# Patient Record
Sex: FEMALE | Race: WHITE | NOT HISPANIC OR LATINO | Employment: STUDENT | URBAN - METROPOLITAN AREA
[De-identification: names, ages, dates, MRNs, and addresses within clinical notes are randomized per-mention and may not be internally consistent; named-entity substitution may affect disease eponyms.]

---
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
## 2020-01-01 ENCOUNTER — NURSE TRIAGE (OUTPATIENT)
Dept: NURSING | Facility: CLINIC | Age: 20
End: 2020-01-01

## 2020-01-02 NOTE — TELEPHONE ENCOUNTER
"Caller has had vomiting and diarrhea since early a.m.; did consume alcohol  on this New Years papa \"but not much\"   Vomiting stopped midday and is tolerating water and  simple carbs; diarrhea every  2 hrs with mild  cramping; no fever   Devloped hives on back of hands  in past  30 minutes; localized and no other  swelling or rash   Triage protocol for vomiting and hives  reviewed and home care discussed   advised to call for new or worsening symptoms.  Caller understand  and will comply   Mariam Brown RN  FNA         Additional Information    Negative: Shock suspected (very weak, limp, not moving, too weak to stand, pale cool skin)    Negative: Sounds like a life-threatening emergency to the triager    Negative: Vomiting occurs without diarrhea    Negative: Diarrhea is the main symptom (vomiting is resolved)    Negative: [1] Vomiting and/or diarrhea is present AND [2] age > 1 year AND [3] ate spoiled food in previous 12 hours    Negative: [1] Diarrhea present AND [2] sounds like infant spitting up (reflux)    Negative: Severe dehydration suspected (very dizzy when tries to stand or has fainted)    Negative: [1] Blood (red or coffee grounds color) in the vomit AND [2] not from a nosebleed  (Exception: Few streaks AND only occurs once AND age > 1 year)    Negative: Difficult to awaken    Negative: Confused (delirious) when awake    Negative: Poisoning suspected (with a medicine, plant or chemical)    Negative: [1] Age < 12 weeks AND [2] fever 100.4 F (38.0 C) or higher rectally    Negative: [1]  (< 1 month old) AND [2] starts to look or act abnormal in any way (e.g., decrease in activity or feeding)    Negative: [1] Bile (green color) in the vomit AND [2] 2 or more times (Exception: Stomach juice which is yellow)    Negative: [1] Age < 12 months AND [2] bile (green color) in the vomit (Exception: Stomach juice which is yellow)    Negative: [1] SEVERE abdominal pain (when not vomiting) AND [2] present > 1 " hour    Negative: Appendicitis suspected (e.g., constant pain > 2 hours, RLQ location, walks bent over holding abdomen, jumping makes pain worse, etc)    Negative: [1] Blood in the diarrhea AND [2] 3 or more times (or large amount)    Negative: [1] Dehydration suspected AND [2] age < 1 year (Signs: no urine > 8 hours AND very dry mouth, no tears, ill appearing, etc.)    Negative: [1] Dehydration suspected AND [2] age > 1 year (Signs: no urine > 12 hours AND very dry mouth, no tears, ill appearing, etc.)    Negative: High-risk child (e.g., diabetes mellitus, recent abdominal surgery)    Negative: [1] Fever AND [2] > 105 F (40.6 C) by any route OR axillary > 104 F (40 C)    Negative: [1] Fever AND [2] weak immune system (sickle cell disease, HIV, splenectomy, chemotherapy, organ transplant, chronic oral steroids, etc)    Negative: Child sounds very sick or weak to the triager    Negative: [1] Age < 12 weeks AND [2] vomited 3 or more times in last 24 hours  (Exception: reflux or spitting up)    Negative: [1] Age < 1 year old AND [2] after receiving frequent sips of ORS per guideline AND [3] continues to vomit 3 or more times AND [4] also has frequent watery diarrhea    Negative: [1] SEVERE vomiting (vomiting everything) > 8 hours (> 12 hours for > 7 yo) AND [2] continues after giving frequent sips of ORS using correct technique per guideline    Negative: [1] Continuous abdominal pain or crying AND [2] persists > 2 hours  (Caution: intermittent abdominal pain that comes on with vomiting and then goes away is common)    Negative: [1] Life-threatening reaction (anaphylaxis) in the past to similar substance (e.g., food, insect bite/sting, chemical, etc.) AND [2] < 2 hours since exposure    Negative: Difficulty breathing or wheezing now    Negative: [1] Swollen tongue AND [2] rapid onset    Negative: [1] Hoarseness or cough now AND [2] rapid onset    Negative: Shock suspected (e.g., cold/pale/clammy skin, too weak to stand,  low BP, rapid pulse)    Negative: Difficult to awaken or acting confused (e.g., disoriented, slurred speech)    Negative: Sounds like a life-threatening emergency to the triager    Negative: [1] Bee, wasp, or yellow jacket sting AND [2] within last 24 hours    Negative: Blood-colored, dark red or purple rash    Negative: [1] Drug rash suspected AND [2] started taking new medicine within last 2 weeks(Exception: antihistamine, eye drops, ear drops, decongestant or other OTC cough/cold medicines)    Negative: Doesn't match the SYMPTOMS of hives    Negative: Swollen tongue    Negative: [1] Widespread hives AND [2] onset < 2 hours of exposure to high-risk allergen (e.g., peanuts, tree nuts, fish or shellfish)    Negative: Patient sounds very sick or weak to the triager    Negative: [1] MODERATE-SEVERE hives persist (i.e., hives interfere with normal activities or work) AND [2] taking antihistamine (e.g., Benadryl, Claritin) > 24 hours    Negative: [1] Hives have become worse AND [2] taking oral steroids (e.g., prednisone) > 24 hours    Negative: Joint swelling    Negative: [1] Abdominal pain AND [2] pain present > 12 hours    Negative: Fever    Negative: [1] Widespread hives, itching or facial swelling AND [2] onset > 2 hours after exposure to high-risk allergen (e.g., sting, nuts, 1st dose of antibiotic)    Negative: [1] Hives from food reaction AND [2] diagnosis never confirmed by a HCP    Negative: Hives persist > 1 week    Negative: [1] Hives has occurred 3 or more times in the last year AND [2] the cause was not found    Localized hives    Protocols used: VOMITING WITH DIARRHEA-P-AH, HIVES-A-AH

## 2020-02-05 ENCOUNTER — TRANSFERRED RECORDS (OUTPATIENT)
Dept: HEALTH INFORMATION MANAGEMENT | Facility: CLINIC | Age: 20
End: 2020-02-05

## 2020-02-05 ENCOUNTER — MEDICAL CORRESPONDENCE (OUTPATIENT)
Dept: HEALTH INFORMATION MANAGEMENT | Facility: CLINIC | Age: 20
End: 2020-02-05

## 2020-02-20 ENCOUNTER — TRANSCRIBE ORDERS (OUTPATIENT)
Dept: OTHER | Age: 20
End: 2020-02-20

## 2020-02-20 DIAGNOSIS — K62.5 RECTAL BLEEDING: Primary | ICD-10-CM

## 2020-02-25 NOTE — TELEPHONE ENCOUNTER
RECORDS RECEIVED FROM: [facility]- Dr. Babita Leija    DATE RECEIVED: 4/22/2020   NOTES STATUS DETAILS   OFFICE NOTE from referring provider Received 2/5/2020 Referral   2/5/2020 Office visit with Dr. Leija    OFFICE NOTE from other specialist N/A    DISCHARGE SUMMARY from hospital N/A    OPERATIVE REPORT N/A    MEDICATION LIST Internal         ENDOSCOPY  N/A    COLONOSCOPY N/A    ERCP N/A    EUS N/A    STOOL TESTING N/A    PERTINENT LABS Internal    PATHOLOGY REPORTS (RELATED) N/A    IMAGING (CT, MRI, EGD) N/A      REFERRAL INFORMATION    Date referral was placed: 4/22/2020   Date all records received: N/A   Date records were scanned into Epic: N/A   Date records were sent to Provider to review: N/A   Date and recommendation received from provider:  LETTER SENT  SCHEDULE APPOINTMENT   Date patient was contacted to schedule: 2/24/2020

## 2020-03-11 ENCOUNTER — HEALTH MAINTENANCE LETTER (OUTPATIENT)
Age: 20
End: 2020-03-11

## 2020-04-22 ENCOUNTER — PRE VISIT (OUTPATIENT)
Dept: SURGERY | Facility: CLINIC | Age: 20
End: 2020-04-22

## 2020-06-19 ENCOUNTER — NURSE TRIAGE (OUTPATIENT)
Dept: NURSING | Facility: CLINIC | Age: 20
End: 2020-06-19

## 2020-06-19 ENCOUNTER — ANCILLARY PROCEDURE (OUTPATIENT)
Dept: ULTRASOUND IMAGING | Facility: CLINIC | Age: 20
End: 2020-06-19
Attending: INTERNAL MEDICINE
Payer: COMMERCIAL

## 2020-06-19 ENCOUNTER — APPOINTMENT (OUTPATIENT)
Dept: GENERAL RADIOLOGY | Facility: CLINIC | Age: 20
End: 2020-06-19
Attending: INTERNAL MEDICINE
Payer: COMMERCIAL

## 2020-06-19 ENCOUNTER — HOSPITAL ENCOUNTER (EMERGENCY)
Facility: CLINIC | Age: 20
Discharge: HOME | End: 2020-06-19
Attending: INTERNAL MEDICINE | Admitting: INTERNAL MEDICINE
Payer: COMMERCIAL

## 2020-06-19 VITALS
TEMPERATURE: 97.8 F | RESPIRATION RATE: 12 BRPM | SYSTOLIC BLOOD PRESSURE: 126 MMHG | HEART RATE: 58 BPM | OXYGEN SATURATION: 100 % | DIASTOLIC BLOOD PRESSURE: 80 MMHG

## 2020-06-19 DIAGNOSIS — R07.89 ATYPICAL CHEST PAIN: ICD-10-CM

## 2020-06-19 LAB
ALBUMIN SERPL-MCNC: 3.6 G/DL (ref 3.4–5)
ALBUMIN UR-MCNC: 10 MG/DL
ALP SERPL-CCNC: 66 U/L (ref 40–150)
ALT SERPL W P-5'-P-CCNC: 28 U/L (ref 0–50)
AMORPH CRY #/AREA URNS HPF: ABNORMAL /HPF
ANION GAP SERPL CALCULATED.3IONS-SCNC: 5 MMOL/L (ref 3–14)
APPEARANCE UR: ABNORMAL
AST SERPL W P-5'-P-CCNC: 46 U/L (ref 0–45)
BASOPHILS # BLD AUTO: 0.1 10E9/L (ref 0–0.2)
BASOPHILS NFR BLD AUTO: 0.6 %
BILIRUB SERPL-MCNC: 0.3 MG/DL (ref 0.2–1.3)
BILIRUB UR QL STRIP: NEGATIVE
BUN SERPL-MCNC: 10 MG/DL (ref 7–30)
CALCIUM SERPL-MCNC: 8.8 MG/DL (ref 8.5–10.1)
CAOX CRY #/AREA URNS HPF: ABNORMAL /HPF
CHLORIDE SERPL-SCNC: 109 MMOL/L (ref 94–109)
CO2 SERPL-SCNC: 25 MMOL/L (ref 20–32)
COLOR UR AUTO: YELLOW
CREAT SERPL-MCNC: 0.76 MG/DL (ref 0.52–1.04)
CRP SERPL-MCNC: 6.6 MG/L (ref 0–8)
D DIMER PPP FEU-MCNC: 0.3 UG/ML FEU (ref 0–0.5)
DIFFERENTIAL METHOD BLD: NORMAL
EOSINOPHIL # BLD AUTO: 0.3 10E9/L (ref 0–0.7)
EOSINOPHIL NFR BLD AUTO: 3.1 %
ERYTHROCYTE [DISTWIDTH] IN BLOOD BY AUTOMATED COUNT: 12.3 % (ref 10–15)
ERYTHROCYTE [SEDIMENTATION RATE] IN BLOOD BY WESTERGREN METHOD: 6 MM/H (ref 0–20)
GFR SERPL CREATININE-BSD FRML MDRD: >90 ML/MIN/{1.73_M2}
GLUCOSE SERPL-MCNC: 100 MG/DL (ref 70–99)
GLUCOSE UR STRIP-MCNC: NEGATIVE MG/DL
HCG UR QL: NEGATIVE
HCT VFR BLD AUTO: 38.7 % (ref 35–47)
HGB BLD-MCNC: 13 G/DL (ref 11.7–15.7)
HGB UR QL STRIP: ABNORMAL
IMM GRANULOCYTES # BLD: 0 10E9/L (ref 0–0.4)
IMM GRANULOCYTES NFR BLD: 0.1 %
INR PPP: 1.06 (ref 0.86–1.14)
INTERNAL QC OK POCT: YES
KETONES UR STRIP-MCNC: NEGATIVE MG/DL
LEUKOCYTE ESTERASE UR QL STRIP: ABNORMAL
LYMPHOCYTES # BLD AUTO: 3.7 10E9/L (ref 0.8–5.3)
LYMPHOCYTES NFR BLD AUTO: 45.1 %
MCH RBC QN AUTO: 29.3 PG (ref 26.5–33)
MCHC RBC AUTO-ENTMCNC: 33.6 G/DL (ref 31.5–36.5)
MCV RBC AUTO: 87 FL (ref 78–100)
MONOCYTES # BLD AUTO: 0.8 10E9/L (ref 0–1.3)
MONOCYTES NFR BLD AUTO: 9.9 %
MUCOUS THREADS #/AREA URNS LPF: PRESENT /LPF
NEUTROPHILS # BLD AUTO: 3.4 10E9/L (ref 1.6–8.3)
NEUTROPHILS NFR BLD AUTO: 41.2 %
NITRATE UR QL: NEGATIVE
NRBC # BLD AUTO: 0 10*3/UL
NRBC BLD AUTO-RTO: 0 /100
PH UR STRIP: 6 PH (ref 5–7)
PLATELET # BLD AUTO: 283 10E9/L (ref 150–450)
POTASSIUM SERPL-SCNC: 3.7 MMOL/L (ref 3.4–5.3)
PROT SERPL-MCNC: 7 G/DL (ref 6.8–8.8)
RBC # BLD AUTO: 4.43 10E12/L (ref 3.8–5.2)
RBC #/AREA URNS AUTO: 0 /HPF (ref 0–2)
SODIUM SERPL-SCNC: 139 MMOL/L (ref 133–144)
SOURCE: ABNORMAL
SP GR UR STRIP: 1.03 (ref 1–1.03)
SQUAMOUS #/AREA URNS AUTO: 2 /HPF (ref 0–1)
TROPONIN I SERPL-MCNC: <0.015 UG/L (ref 0–0.04)
UROBILINOGEN UR STRIP-MCNC: 2 MG/DL (ref 0–2)
WBC # BLD AUTO: 8.1 10E9/L (ref 4–11)
WBC #/AREA URNS AUTO: 13 /HPF (ref 0–5)

## 2020-06-19 PROCEDURE — 99285 EMERGENCY DEPT VISIT HI MDM: CPT | Mod: 25 | Performed by: INTERNAL MEDICINE

## 2020-06-19 PROCEDURE — 93005 ELECTROCARDIOGRAM TRACING: CPT | Performed by: INTERNAL MEDICINE

## 2020-06-19 PROCEDURE — 25800030 ZZH RX IP 258 OP 636: Performed by: INTERNAL MEDICINE

## 2020-06-19 PROCEDURE — 85025 COMPLETE CBC W/AUTO DIFF WBC: CPT | Performed by: INTERNAL MEDICINE

## 2020-06-19 PROCEDURE — 93308 TTE F-UP OR LMTD: CPT | Mod: 26 | Performed by: INTERNAL MEDICINE

## 2020-06-19 PROCEDURE — 85652 RBC SED RATE AUTOMATED: CPT | Performed by: INTERNAL MEDICINE

## 2020-06-19 PROCEDURE — 96361 HYDRATE IV INFUSION ADD-ON: CPT | Performed by: INTERNAL MEDICINE

## 2020-06-19 PROCEDURE — 81001 URINALYSIS AUTO W/SCOPE: CPT | Performed by: INTERNAL MEDICINE

## 2020-06-19 PROCEDURE — 81025 URINE PREGNANCY TEST: CPT | Performed by: INTERNAL MEDICINE

## 2020-06-19 PROCEDURE — 93308 TTE F-UP OR LMTD: CPT | Performed by: INTERNAL MEDICINE

## 2020-06-19 PROCEDURE — 86140 C-REACTIVE PROTEIN: CPT | Performed by: INTERNAL MEDICINE

## 2020-06-19 PROCEDURE — 80053 COMPREHEN METABOLIC PANEL: CPT | Performed by: INTERNAL MEDICINE

## 2020-06-19 PROCEDURE — 85610 PROTHROMBIN TIME: CPT | Performed by: INTERNAL MEDICINE

## 2020-06-19 PROCEDURE — 85379 FIBRIN DEGRADATION QUANT: CPT | Performed by: INTERNAL MEDICINE

## 2020-06-19 PROCEDURE — 96360 HYDRATION IV INFUSION INIT: CPT | Performed by: INTERNAL MEDICINE

## 2020-06-19 PROCEDURE — 84484 ASSAY OF TROPONIN QUANT: CPT | Performed by: INTERNAL MEDICINE

## 2020-06-19 PROCEDURE — 93010 ELECTROCARDIOGRAM REPORT: CPT | Mod: Z6 | Performed by: INTERNAL MEDICINE

## 2020-06-19 PROCEDURE — 71046 X-RAY EXAM CHEST 2 VIEWS: CPT

## 2020-06-19 PROCEDURE — 87086 URINE CULTURE/COLONY COUNT: CPT | Performed by: INTERNAL MEDICINE

## 2020-06-19 RX ORDER — FAMOTIDINE 20 MG/1
20 TABLET, FILM COATED ORAL 2 TIMES DAILY
Qty: 30 TABLET | Refills: 0 | Status: SHIPPED | OUTPATIENT
Start: 2020-06-19 | End: 2020-07-04

## 2020-06-19 RX ORDER — CITALOPRAM HYDROBROMIDE 10 MG/1
20 TABLET ORAL DAILY
COMMUNITY
End: 2021-03-31

## 2020-06-19 RX ADMIN — SODIUM CHLORIDE 1000 ML: 9 INJECTION, SOLUTION INTRAVENOUS at 21:56

## 2020-06-19 ASSESSMENT — ENCOUNTER SYMPTOMS
CHEST TIGHTNESS: 1
NERVOUS/ANXIOUS: 1

## 2020-06-19 NOTE — ED AVS SNAPSHOT
[facility], [place], Emergency Department  [address]  [facility] [address]  Phone:  [phone]  Fax:  [phone]                                    Zainab Wells   MRN: [id number]    Department:  [facility], Emergency Department   Date of Visit:  6/19/2020           After Visit Summary Signature Page    I have received my discharge instructions, and my questions have been answered. I have discussed any challenges I see with this plan with the nurse or doctor.    ..........................................................................................................................................  Patient/Patient Representative Signature      ..........................................................................................................................................  Patient Representative Print Name and Relationship to Patient    ..................................................               ................................................  Date                                   Time    ..........................................................................................................................................  Reviewed by Signature/Title    ...................................................              ..............................................  Date                                               Time          22EPIC Rev 08/18

## 2020-06-20 LAB — INTERPRETATION ECG - MUSE: NORMAL

## 2020-06-20 ASSESSMENT — ENCOUNTER SYMPTOMS
FEVER: 0
HEADACHES: 0
ARTHRALGIAS: 0
SHORTNESS OF BREATH: 0
COLOR CHANGE: 0
EYE REDNESS: 0
DIFFICULTY URINATING: 0
CONFUSION: 0
ABDOMINAL PAIN: 0
NECK STIFFNESS: 0

## 2020-06-20 NOTE — TELEPHONE ENCOUNTER
"Patient has had chest tightness over the last month and treated with inhalers which \"haven't really helped.\"  Was advised to go to hospital if it got worse.  Tonight is having pain/tightness with some shortness of breath.  Will go to ER per guidelines.    Ame Greenwood RN  [facility]      Reason for Disposition    Difficulty breathing    Additional Information    Negative: Severe difficulty breathing (e.g., struggling for each breath, speaks in single words)    Negative: Difficult to awaken or acting confused (e.g., disoriented, slurred speech)    Negative: Shock suspected (e.g., cold/pale/clammy skin, too weak to stand, low BP, rapid pulse)    Negative: [1] Chest pain lasts > 5 minutes AND [2] history of heart disease  (i.e., heart attack, bypass surgery, angina, angioplasty, CHF; not just a heart murmur)    Negative: [1] Chest pain lasts > 5 minutes AND [2] described as crushing, pressure-like, or heavy    Negative: [1] Chest pain lasts > 5 minutes AND [2] age > 50    Negative: [1] Chest pain lasts > 5 minutes AND [2] age > 30 AND [3] at least one cardiac risk factor (i.e., hypertension, diabetes, obesity, smoker or strong family history of heart disease)    Negative: [1] Chest pain lasts > 5 minutes AND [2] not relieved with nitroglycerin    Negative: Passed out (i.e., lost consciousness, collapsed and was not responding)    Negative: Heart beating < 50 beats per minute OR > 140 beats per minute    Negative: Visible sweat on face or sweat dripping down face    Negative: Sounds like a life-threatening emergency to the triager    Negative: SEVERE chest pain    Negative: [1] Intermittent  chest pain or \"angina\" AND [2] increasing in severity or frequency  (Exception: pains lasting a few seconds)    Negative: Pain also present in shoulder(s) or arm(s) or jaw  (Exception: pain is clearly made worse by movement)    Protocols used: CHEST PAIN-A-AH      "

## 2020-06-20 NOTE — ED NOTES
Pt was given discharge instructions and understands to  her prescribed medication. Vital signs stable. No further questions or concerns.

## 2020-06-20 NOTE — DISCHARGE INSTRUCTIONS
Noncardiac Chest Pain    Based on your visit today, the healthcare provider doesn t know what is causing your chest pain. In most cases, people who come to the emergency department with chest pain don t have a problem with their heart. Instead, the pain is caused by other conditions. It's important for the healthcare team to be sure you are not having a life threatening cause for chest pain such as a heart attack, blood clot in the lungs, collapsed lung, ruptured esophagus, or tearing of the aorta. Once these major causes have been ruled out, you may have further evaluation for non-heart causes of chest pain. These may be problems with the lungs, muscles, bones, digestive tract, nerves, or mental health.  Lung problems    Inflammation around the lungs (pleurisy)    Collapsed lung (pneumothorax)    Fluid around the lungs (pleural effusion)    Lung cancer (a rare cause of chest pain)  Muscle or bone problems    Inflamed cartilage between the ribs (costochondritis)    Fibromyalgia    Rheumatoid arthritis    Chest wall strain  Digestive system problems    Reflux    Stomach ulcer    Spasms of the esophagus    Gall stones    Gallbladder inflammation  Mental health conditions    Panic or anxiety attacks    Emotional distress  Your condition doesn t seem serious and your pain doesn t appear to be coming from your heart. But sometimes the signs of a serious problem take more time to appear. Watch for the warning signs listed below.  Home care  Follow these guidelines when caring for yourself at home:    Rest today and avoid strenuous activity.    Take any prescribed medicine as directed.  Follow-up care  Follow up with your healthcare provider, or as advised, if you don t start to feel better within 24 hours.  When to seek medical advice  Call your healthcare provider right away if any of these occur:    A change in the type of pain. Call if it feels different, becomes more serious, lasts longer, or begins to spread into  your shoulder, arm, neck, jaw, or back.    Shortness of breath    You feel more pain when you breathe    Cough with dark-colored mucus or blood    Weakness, dizziness, or fainting    Fever of 100.4 F (38 C) or higher, or as directed by your healthcare provider    Swelling, pain, or redness in one leg  Date Last Reviewed: 12/1/2016 2000-2019 [employer] [address] [place] [address]. All rights reserved. This information is not intended as a substitute for professional medical care. Always follow your healthcare professional's instructions.      Please make an appointment to follow up with Your Primary Care Provider in 5-10 days even if entirely better.

## 2020-06-20 NOTE — ED PROVIDER NOTES
[place] EMERGENCY DEPARTMENT ([facility])  June 19, 2020  ED 10  9:36 PM   History     Chief Complaint   Patient presents with     Anxiety     Chest Pain     The history is provided by the patient and medical records.     Zainab Wells is a 20 year old female with prior episodes of vasovagal syncope who presents with intermittent episodes of chest tightness. She states symptoms were mild for the past month. She was seen by a provider who prescribed her an inhaler. She was told that if symptoms worsened she should go to the ER. She had COVID-19 testing 1 week ago at [facility] in [place], which was negative. She denies cardiac history. No fevers. No lower extremity edema.     Epic records reviewed, she did have a thorough work-up for episodes of vasovagal syncope in 2018 with a Zio patch Holter monitor, tilt table testing, as well as an echocardiogram.  She did have a syncopal spell after receiving 0.4 mg sublingual nitroglycerin during the tilt table testing.  It was felt that she had a normal physiologic response.      PAST MEDICAL HISTORY:   Past Medical History:   Diagnosis Date     Anemia      GERD (gastroesophageal reflux disease)        PAST SURGICAL HISTORY:   Past Surgical History:   Procedure Laterality Date     ORTHOPEDIC SURGERY Right     shoulder       Past medical history, past surgical history, medications, and allergies were reviewed with the patient. Additional pertinent items: None    FAMILY HISTORY: History reviewed. No pertinent family history.    SOCIAL HISTORY:   Social History     Tobacco Use     Smoking status: Never Smoker     Smokeless tobacco: Never Used   Substance Use Topics     Alcohol use: No     Social history was reviewed with the patient. Additional pertinent items: None      Discharge Medication List as of 6/19/2020 11:51 PM      START taking these medications    Details   famotidine (PEPCID) 20 MG tablet Take 1 tablet (20 mg) by mouth 2 times daily for 15  days, Disp-30 tablet,R-0, E-Prescribe         CONTINUE these medications which have NOT CHANGED    Details   citalopram (CELEXA) 10 MG tablet Take 10 mg by mouth daily, Historical      crisaborole (EUCRISA) 2 % ointment Apply topically 2 times dailyHistorical      JUNEL 1/20 1-20 MG-MCG per tablet TAKE 1 TABLET BY MOUTH DAILY FOR 21 DAYS, off FOR 7 DAYS, THEN REPEAT., R-10, MUSTAPHA, Historical      minocycline (MINOCIN/DYNACIN) 50 MG capsule TAKE 1 CAPSULE BY MOUTH DAILY FOR ACNE., R-11, Historical      omeprazole (PRILOSEC OTC) 20 MG EC tablet Take 20 mg by mouth daily, Historical      Pyridoxine HCl (VITAMIN B6 PO) Take 250 mg by mouth daily, Historical      tacrolimus (PROTOPIC) 0.1 % ointment APPLY TOPICALLY TWICE DAILY TO ECZEMA ON FACE.R-1Historical      VITAMIN D, CHOLECALCIFEROL, PO Take 5,000 Units by mouth daily, Historical                Allergies   Allergen Reactions     Bactrim [Sulfamethoxazole W/Trimethoprim]         Review of Systems   Constitutional: Negative for fever.   HENT: Negative for congestion.    Eyes: Negative for redness.   Respiratory: Positive for chest tightness. Negative for shortness of breath.    Cardiovascular: Negative for chest pain.   Gastrointestinal: Negative for abdominal pain.   Genitourinary: Negative for difficulty urinating.   Musculoskeletal: Negative for arthralgias and neck stiffness.   Skin: Negative for color change.   Neurological: Negative for headaches.   Psychiatric/Behavioral: Negative for confusion. The patient is nervous/anxious.      A complete review of systems was performed with pertinent positives and negatives noted in the HPI, and all other systems negative.    Physical Exam   BP: 135/73  Pulse: 98  Heart Rate: 51  Temp: 97.7  F (36.5  C)  Resp: 20  SpO2: 98 %      Physical Exam  Constitutional:       General: She is not in acute distress.     Appearance: She is not diaphoretic.   HENT:      Head: Atraumatic.      Mouth/Throat:      Pharynx: No oropharyngeal  exudate.   Eyes:      General: No scleral icterus.     Pupils: Pupils are equal, round, and reactive to light.   Neck:      Musculoskeletal: Neck supple.   Cardiovascular:      Rate and Rhythm: Normal rate and regular rhythm.      Heart sounds: Normal heart sounds. No murmur. No friction rub. No gallop.    Pulmonary:      Effort: Pulmonary effort is normal. No respiratory distress.      Breath sounds: Normal breath sounds. No stridor. No wheezing, rhonchi or rales.   Chest:      Chest wall: No tenderness.   Abdominal:      General: Abdomen is flat. Bowel sounds are normal. There is no distension.      Palpations: Abdomen is soft. There is no mass.      Tenderness: There is no abdominal tenderness. There is no right CVA tenderness, left CVA tenderness, guarding or rebound.      Hernia: No hernia is present.   Musculoskeletal:         General: No tenderness.   Skin:     General: Skin is warm.      Findings: No rash.   Neurological:      General: No focal deficit present.         ED Course        Procedures  Results for orders placed during the hospital encounter of 06/19/20   POC US ECHO LIMITED    Impression Limited Bedside Cardiac Ultrasound, performed and interpreted by me.   Indication: Chest Pain.  Parasternal long axis, parasternal short axis, apical 4 chamber and subcostal views were acquired.   Image quality was satisfactory.    Findings:    Global left ventricular function appears intact.  Chambers do not appear dilated.  There is no evidence of free fluid within the pericardium.    IMPRESSION: Grossly normal left ventricular function and chamber size.  No pericardial effusion..              EKG Interpretation:      Interpreted by Stephanie Minaya MD, MD  Time reviewed: on arrival  Symptoms at time of EKG: cp   Rhythm: sinus bradycardia  Rate: Bradycardia  Axis: Right Axis Deviation  Ectopy: none  Conduction: normal  ST Segments/ T Waves: No ST-T wave changes  Q Waves: none  Comparison to prior: Unchanged from  2018    Clinical Impression: no acute changes                              Results for orders placed or performed during the hospital encounter of 06/19/20 (from the past 24 hour(s))   EKG 12 lead   Result Value Ref Range    Interpretation ECG Click View Image link to view waveform and result    CBC with platelets differential   Result Value Ref Range    WBC 8.1 4.0 - 11.0 10e9/L    RBC Count 4.43 3.8 - 5.2 10e12/L    Hemoglobin 13.0 11.7 - 15.7 g/dL    Hematocrit 38.7 35.0 - 47.0 %    MCV 87 78 - 100 fl    MCH 29.3 26.5 - 33.0 pg    MCHC 33.6 31.5 - 36.5 g/dL    RDW 12.3 10.0 - 15.0 %    Platelet Count 283 150 - 450 10e9/L    Diff Method Automated Method     % Neutrophils 41.2 %    % Lymphocytes 45.1 %    % Monocytes 9.9 %    % Eosinophils 3.1 %    % Basophils 0.6 %    % Immature Granulocytes 0.1 %    Nucleated RBCs 0 0 /100    Absolute Neutrophil 3.4 1.6 - 8.3 10e9/L    Absolute Lymphocytes 3.7 0.8 - 5.3 10e9/L    Absolute Monocytes 0.8 0.0 - 1.3 10e9/L    Absolute Eosinophils 0.3 0.0 - 0.7 10e9/L    Absolute Basophils 0.1 0.0 - 0.2 10e9/L    Abs Immature Granulocytes 0.0 0 - 0.4 10e9/L    Absolute Nucleated RBC 0.0    INR   Result Value Ref Range    INR 1.06 0.86 - 1.14   Comprehensive metabolic panel   Result Value Ref Range    Sodium 139 133 - 144 mmol/L    Potassium 3.7 3.4 - 5.3 mmol/L    Chloride 109 94 - 109 mmol/L    Carbon Dioxide 25 20 - 32 mmol/L    Anion Gap 5 3 - 14 mmol/L    Glucose 100 (H) 70 - 99 mg/dL    Urea Nitrogen 10 7 - 30 mg/dL    Creatinine 0.76 0.52 - 1.04 mg/dL    GFR Estimate >90 >60 mL/min/[1.73_m2]    GFR Estimate If Black >90 >60 mL/min/[1.73_m2]    Calcium 8.8 8.5 - 10.1 mg/dL    Bilirubin Total 0.3 0.2 - 1.3 mg/dL    Albumin 3.6 3.4 - 5.0 g/dL    Protein Total 7.0 6.8 - 8.8 g/dL    Alkaline Phosphatase 66 40 - 150 U/L    ALT 28 0 - 50 U/L    AST 46 (H) 0 - 45 U/L   Troponin I   Result Value Ref Range    Troponin I ES <0.015 0.000 - 0.045 ug/L   Erythrocyte sedimentation rate auto    Result Value Ref Range    Sed Rate 6 0 - 20 mm/h   CRP inflammation   Result Value Ref Range    CRP Inflammation 6.6 0.0 - 8.0 mg/L   D dimer quantitative   Result Value Ref Range    D Dimer 0.3 0.0 - 0.50 ug/ml FEU   UA reflex to Microscopic and Culture    Specimen: Urine Midstream; Midstream Urine   Result Value Ref Range    Color Urine Yellow     Appearance Urine Slightly Cloudy     Glucose Urine Negative NEG^Negative mg/dL    Bilirubin Urine Negative NEG^Negative    Ketones Urine Negative NEG^Negative mg/dL    Specific Gravity Urine 1.029 1.003 - 1.035    Blood Urine Trace (A) NEG^Negative    pH Urine 6.0 5.0 - 7.0 pH    Protein Albumin Urine 10 (A) NEG^Negative mg/dL    Urobilinogen mg/dL 2.0 0.0 - 2.0 mg/dL    Nitrite Urine Negative NEG^Negative    Leukocyte Esterase Urine Small (A) NEG^Negative    Source Midstream Urine     RBC Urine 0 0 - 2 /HPF    WBC Urine 13 (H) 0 - 5 /HPF    Squamous Epithelial /HPF Urine 2 (H) 0 - 1 /HPF    Mucous Urine Present (A) NEG^Negative /LPF    Calcium Oxalate Many (A) NEG^Negative /HPF    Amorphous Crystals Few (A) NEG^Negative /HPF   hCG qual urine POCT   Result Value Ref Range    HCG Qual Urine Negative neg    Internal QC OK Yes    POC US ECHO LIMITED    Impression    Limited Bedside Cardiac Ultrasound, performed and interpreted by me.   Indication: Chest Pain.  Parasternal long axis, parasternal short axis, apical 4 chamber and subcostal views were acquired.   Image quality was satisfactory.    Findings:    Global left ventricular function appears intact.  Chambers do not appear dilated.  There is no evidence of free fluid within the pericardium.    IMPRESSION: Grossly normal left ventricular function and chamber size.  No pericardial effusion..    XR Chest 2 Views    Narrative    EXAM: XR CHEST 2 VW  LOCATION: [facility]  DATE/TIME: 6/19/2020 11:03 PM    INDICATION: Chest pain.  COMPARISON: None.      Impression    IMPRESSION: Negative chest.     Medications    0.9% sodium chloride BOLUS (0 mLs Intravenous Stopped 6/19/20 5850)             Assessments & Plan (with Medical Decision Making)    Atypical CP in the pt with h/o vasovagal syncope, etiology not clear but EKG trop, POCUS neg, CXR neg, D Dimer neg and other labs ok, will start trial of pepcid and follow up with her PMD within one week.         I have reviewed the nursing notes.    I have reviewed the findings, diagnosis, plan and need for follow up with the patient.    Discharge Medication List as of 6/19/2020 11:51 PM      START taking these medications    Details   famotidine (PEPCID) 20 MG tablet Take 1 tablet (20 mg) by mouth 2 times daily for 15 days, Disp-30 tablet,R-0, E-Prescribe             Final diagnoses:   Atypical chest pain   Frida ZHOU, am serving as a trained medical scribe to document services personally performed by Stephanie Minaya MD based on the provider's statements to me on June 19, 2020.  This document has been checked and approved by the attending provider.    IStephanie MD, was physically present and have reviewed and verified the accuracy of this note documented by Frida Walters, medical scribe.       6/19/2020   [facility], [place], EMERGENCY DEPARTMENT     Stephanie Minaya MD  06/20/20 0058

## 2020-06-21 LAB
BACTERIA SPEC CULT: NORMAL
Lab: NORMAL
SPECIMEN SOURCE: NORMAL

## 2020-11-05 ENCOUNTER — PRE VISIT (OUTPATIENT)
Dept: SURGERY | Facility: CLINIC | Age: 20
End: 2020-11-05

## 2020-11-05 ENCOUNTER — OFFICE VISIT (OUTPATIENT)
Dept: SURGERY | Facility: CLINIC | Age: 20
End: 2020-11-05
Attending: FAMILY MEDICINE
Payer: COMMERCIAL

## 2020-11-05 VITALS
SYSTOLIC BLOOD PRESSURE: 129 MMHG | DIASTOLIC BLOOD PRESSURE: 73 MMHG | HEIGHT: 68 IN | WEIGHT: 186.3 LBS | OXYGEN SATURATION: 98 % | HEART RATE: 42 BPM | BODY MASS INDEX: 28.23 KG/M2

## 2020-11-05 DIAGNOSIS — R19.7 DIARRHEA, UNSPECIFIED TYPE: ICD-10-CM

## 2020-11-05 DIAGNOSIS — K62.5 RECTAL BLEEDING: Primary | ICD-10-CM

## 2020-11-05 DIAGNOSIS — K59.09 OTHER CONSTIPATION: ICD-10-CM

## 2020-11-05 PROCEDURE — 99202 OFFICE O/P NEW SF 15 MIN: CPT | Performed by: NURSE PRACTITIONER

## 2020-11-05 ASSESSMENT — MIFFLIN-ST. JEOR: SCORE: 1663.55

## 2020-11-05 ASSESSMENT — PAIN SCALES - GENERAL: PAINLEVEL: MODERATE PAIN (5)

## 2020-11-05 NOTE — PROGRESS NOTES
"Colon and Rectal Surgery Consult Clinic Note    Date: 2020     Referring provider:  [place] [address]     RE: Zainab Wells  : 2000  TAMEKA: 2020    Zainab Wells is a very pleasant 20 year old female without a significant past medical history who presents today for rectal bleeding.    Zainab has had intermittent rectal bleeding for many years.  This occurs several times a week and is typically just on the toilet paper with wiping.  She has intermittent constipation and diarrhea.  She will have times where she has constipation for several weeks and then will have diarrhea for several weeks and this can be sudden urges to have bowel movements.  She has associated abdominal pain with bowel movements and some sharp anal pain this harder stools.  She is not on any bowel medications but has tried increasing her dietary fiber without improvement in symptoms.  Her sister was recently diagnosed with ulcerative colitis and she has a family history of a maternal grandfather with colon cancer.  She has never had a colonoscopy.  She denies any nausea, vomiting, or unexplained weight loss.    Physical Examination:  /73 (BP Location: Left arm, Patient Position: Sitting, Cuff Size: Adult Regular)   Pulse (!) 42   Ht 5' 8\"   Wt 186 lb 4.8 oz   SpO2 98%   BMI 28.33 kg/m    General: Alert, oriented, in no acute distress, sitting comfortably  HEENT: Mucous membranes moist    Perianal external examination: Exam was chaperoned by BELKIS Rivas   Perianal skin: Intact with no excoriation or lichenification.  Lesions: No evidence of an external lesion, nodularity, or induration in the perianal region.  Eversion of buttocks: There was not evidence of an anal fissure. Details: N/A.  Skin tags or external hemorrhoids: None.  Digital rectal examination: Was performed.   Sphincter tone: Good.  Palpable lesions: No.  Other: " None.  Bimanual examination: was not performed    Anoscopy: Was performed.   Hemorrhoids: No significant internal hemorrhoids.  Lesions: No    Assessment/Plan: 20 year old female with intermittent constipation, diarrhea, and rectal bleeding. Her bleeding and anal pain sound consistent with an anal fissure but no fissure seen on exam today and she had bleeding as recently as yesterday. Given her abdominal pain, diarrhea, constipation, and family history of UC, would like her to get a full colonoscopy. Recommended starting a daily fiber supplement to try to regulate bowel movements to see if this improves symptoms. Return to clinic after colonoscopy if bleeding or pain return. Patient's questions were answered to her stated satisfaction and she is in agreement with this plan.      Medical history:  Past Medical History:   Diagnosis Date     Anemia      GERD (gastroesophageal reflux disease)        Surgical history:  Past Surgical History:   Procedure Laterality Date     ORTHOPEDIC SURGERY Right     shoulder       Problem list:    Patient Active Problem List    Diagnosis Date Noted     Syncope 09/12/2018     Priority: Medium       Medications:  Current Outpatient Medications   Medication Sig Dispense Refill     citalopram (CELEXA) 10 MG tablet Take 20 mg by mouth daily        crisaborole (EUCRISA) 2 % ointment Apply topically 2 times daily       levonorgestrel (MIRENA) 20 MCG/24HR IUD 1 each by Intrauterine route once       VITAMIN D, CHOLECALCIFEROL, PO Take 5,000 Units by mouth daily       JUNEL 1/20 1-20 MG-MCG per tablet TAKE 1 TABLET BY MOUTH DAILY FOR 21 DAYS, off FOR 7 DAYS, THEN REPEAT.  10     minocycline (MINOCIN/DYNACIN) 50 MG capsule TAKE 1 CAPSULE BY MOUTH DAILY FOR ACNE.  11     omeprazole (PRILOSEC OTC) 20 MG EC tablet Take 20 mg by mouth daily       Pyridoxine HCl (VITAMIN B6 PO) Take 250 mg by mouth daily       tacrolimus (PROTOPIC) 0.1 % ointment APPLY TOPICALLY TWICE DAILY TO ECZEMA ON FACE.  1  "      Allergies:  Allergies   Allergen Reactions     Bactrim [Sulfamethoxazole W/Trimethoprim]        Family history:  No family history on file.    Social history:  Social History     Tobacco Use     Smoking status: Never Smoker     Smokeless tobacco: Never Used   Substance Use Topics     Alcohol use: No    Marital status: single.  Occupation: student in Croatian and environmental studies    Nursing Notes:   Mar Mccord EMT  11/5/2020 10:24 AM  Signed  Chief Complaint   Patient presents with     Consult     Rectal bleeding       Vitals:    11/05/20 1021   BP: 129/73   BP Location: Left arm   Patient Position: Sitting   Cuff Size: Adult Regular   Pulse: (!) 42   SpO2: 98%   Weight: 186 lb 4.8 oz   Height: 5' 8\"       Body mass index is 28.33 kg/m .      BELKIS Rivas                         Total face to face time was 20 minutes, >50% counseling.    DARELL Zavala, NP-C  Colon and Rectal Surgery   [facility]    This note was created using speech recognition software and may contain unintended word substitutions.    "

## 2020-11-05 NOTE — NURSING NOTE
"Chief Complaint   Patient presents with     Consult     Rectal bleeding       Vitals:    11/05/20 1021   BP: 129/73   BP Location: Left arm   Patient Position: Sitting   Cuff Size: Adult Regular   Pulse: (!) 42   SpO2: 98%   Weight: 186 lb 4.8 oz   Height: 5' 8\"       Body mass index is 28.33 kg/m .      Mar Jones, EMT                      "

## 2020-11-05 NOTE — PATIENT INSTRUCTIONS
Follow up:      Start a daily fiber supplement such as Citrucel or Metamucil. Start with once a day and slowly increase up to three times a day, if needed, over the next 4-6 weeks.     Colonoscopy. They will call you to schedule this, if you do not hear from them today or tomorrow please call [facility] ([facility]) at [phone].    Follow-up depending on results of colonoscopy.    Please call with any questions or concerns regarding your clinic visit today.    It is a pleasure being involved in your health care.    Contacts post-consultation depending on your need:    Radiology Appointments [phone]    Schedule Clinic Appointments [phone] # 1   M-F 7:30 - 5 pm    BELLO Baker [phone]    Clinic Fax Number [phone]    Surgery Scheduling [phone]    My Chart is available 24 hours a day and is a secure way to access your records and communicate with your care team.  I strongly recommend signing up if you haven't already done so, if you are comfortable with computers.  If you would like to inquire about this or are having problems with My Chart access, you may call [phone] or go online at [email or web address].  Please allow at least 24 hours for a response and extra time on weekends and Holidays.

## 2020-11-06 ENCOUNTER — HOSPITAL ENCOUNTER (OUTPATIENT)
Facility: AMBULATORY SURGERY CENTER | Age: 20
End: 2020-11-06
Attending: INTERNAL MEDICINE
Payer: COMMERCIAL

## 2020-11-15 DIAGNOSIS — Z11.59 ENCOUNTER FOR SCREENING FOR OTHER VIRAL DISEASES: Primary | ICD-10-CM

## 2020-11-18 ENCOUNTER — TELEPHONE (OUTPATIENT)
Dept: SURGERY | Facility: CLINIC | Age: 20
End: 2020-11-18

## 2020-11-18 NOTE — TELEPHONE ENCOUNTER
Called patient to discuss her C survey and comments. Requested a callback at her earliest convenience.     BELLO Kern BSN  Surgery Clinic and    [facility] Supervisor   [phone]

## 2020-11-19 NOTE — TELEPHONE ENCOUNTER
Called patient to discuss her C survey and comments. Requested a callback at her earliest convenience.     BELLO Kern BSN  Surgery Clinic and    [facility] Clinic Supervisor   [phone]

## 2020-11-23 NOTE — TELEPHONE ENCOUNTER
Called patient regarding her C survey comments. Advised patient this would be the last attempt to connect with her and if she would like to discuss she should callback at her earliest convenience.

## 2020-12-09 ENCOUNTER — TELEPHONE (OUTPATIENT)
Dept: GASTROENTEROLOGY | Facility: CLINIC | Age: 20
End: 2020-12-09

## 2020-12-09 NOTE — TELEPHONE ENCOUNTER
Patient called to cancel Colonoscopy procedure with Dr. Torres on 12/15/20. Will call back to reschedule when ready.    Procedure: Lower Endoscopy    Lower Endoscopy Type: Colonoscopy    Purpose of Colonoscopy Procedure: Diagnostic    Colonoscopy reason for referral: Rectal bleeding, diarrhea, constipation, family history of UC    Colonoscopy Sedation: Conscious/Moderate    Preferred Location: [facility]    Scheduling Instructions: If you have not heard from the scheduling office within 2 business days, please call [phone].           Associated Diagnoses    Rectal bleeding [K62.5]  - Primary       Diarrhea, unspecified type [R19.7]

## 2021-01-03 ENCOUNTER — HEALTH MAINTENANCE LETTER (OUTPATIENT)
Age: 21
End: 2021-01-03

## 2021-03-18 ENCOUNTER — TRANSFERRED RECORDS (OUTPATIENT)
Dept: HEALTH INFORMATION MANAGEMENT | Facility: CLINIC | Age: 21
End: 2021-03-18

## 2021-03-31 ENCOUNTER — HOSPITAL ENCOUNTER (OUTPATIENT)
Facility: CLINIC | Age: 21
Setting detail: OBSERVATION
Discharge: HOME | End: 2021-04-01
Attending: EMERGENCY MEDICINE | Admitting: INTERNAL MEDICINE
Payer: COMMERCIAL

## 2021-03-31 ENCOUNTER — TRANSFERRED RECORDS (OUTPATIENT)
Dept: HEALTH INFORMATION MANAGEMENT | Facility: CLINIC | Age: 21
End: 2021-03-31

## 2021-03-31 DIAGNOSIS — R50.9 FEVER AND CHILLS: ICD-10-CM

## 2021-03-31 DIAGNOSIS — R55 VASOVAGAL SYNCOPE: ICD-10-CM

## 2021-03-31 DIAGNOSIS — K92.1 HEMATOCHEZIA: ICD-10-CM

## 2021-03-31 DIAGNOSIS — G44.201 ACUTE INTRACTABLE TENSION-TYPE HEADACHE: ICD-10-CM

## 2021-03-31 LAB
ALBUMIN SERPL-MCNC: 4.3 G/DL (ref 3.4–5)
ALBUMIN UR-MCNC: NEGATIVE MG/DL
ALP SERPL-CCNC: 68 U/L (ref 40–150)
ALT SERPL W P-5'-P-CCNC: 18 U/L (ref 0–50)
ANION GAP SERPL CALCULATED.3IONS-SCNC: 7 MMOL/L (ref 3–14)
APPEARANCE UR: CLEAR
APTT PPP: 33 SEC (ref 22–37)
AST SERPL W P-5'-P-CCNC: 15 U/L (ref 0–45)
B-HCG SERPL-ACNC: <1 IU/L (ref 0–5)
BASOPHILS # BLD AUTO: 0.1 10E9/L (ref 0–0.2)
BASOPHILS NFR BLD AUTO: 0.3 %
BILIRUB SERPL-MCNC: 0.6 MG/DL (ref 0.2–1.3)
BILIRUB UR QL STRIP: NEGATIVE
BUN SERPL-MCNC: 5 MG/DL (ref 7–30)
C DIFF TOX B STL QL: NEGATIVE
CALCIUM SERPL-MCNC: 9.6 MG/DL (ref 8.5–10.1)
CHLORIDE SERPL-SCNC: 104 MMOL/L (ref 94–109)
CO2 SERPL-SCNC: 26 MMOL/L (ref 20–32)
COLOR UR AUTO: NORMAL
CREAT SERPL-MCNC: 0.83 MG/DL (ref 0.52–1.04)
CRP SERPL-MCNC: 22 MG/L (ref 0–8)
DIFFERENTIAL METHOD BLD: ABNORMAL
EOSINOPHIL # BLD AUTO: 0 10E9/L (ref 0–0.7)
EOSINOPHIL NFR BLD AUTO: 0.3 %
ERYTHROCYTE [DISTWIDTH] IN BLOOD BY AUTOMATED COUNT: 11.9 % (ref 10–15)
ERYTHROCYTE [SEDIMENTATION RATE] IN BLOOD BY WESTERGREN METHOD: 5 MM/H (ref 0–20)
FLUAV RNA RESP QL NAA+PROBE: NEGATIVE
FLUBV RNA RESP QL NAA+PROBE: NEGATIVE
GFR SERPL CREATININE-BSD FRML MDRD: >90 ML/MIN/{1.73_M2}
GLUCOSE SERPL-MCNC: 84 MG/DL (ref 70–99)
GLUCOSE UR STRIP-MCNC: NEGATIVE MG/DL
HCT VFR BLD AUTO: 44.6 % (ref 35–47)
HGB BLD-MCNC: 14.9 G/DL (ref 11.7–15.7)
HGB UR QL STRIP: NEGATIVE
IMM GRANULOCYTES # BLD: 0.1 10E9/L (ref 0–0.4)
IMM GRANULOCYTES NFR BLD: 0.3 %
INR PPP: 1.07 (ref 0.86–1.14)
INTERPRETATION ECG - MUSE: NORMAL
KETONES UR STRIP-MCNC: NEGATIVE MG/DL
LABORATORY COMMENT REPORT: NORMAL
LACTATE BLD-SCNC: 1.3 MMOL/L (ref 0.7–2)
LDH SERPL L TO P-CCNC: 182 U/L (ref 81–234)
LEUKOCYTE ESTERASE UR QL STRIP: NEGATIVE
LIPASE SERPL-CCNC: 71 U/L (ref 73–393)
LYMPHOCYTES # BLD AUTO: 0.8 10E9/L (ref 0.8–5.3)
LYMPHOCYTES NFR BLD AUTO: 5.6 %
MCH RBC QN AUTO: 29.3 PG (ref 26.5–33)
MCHC RBC AUTO-ENTMCNC: 33.4 G/DL (ref 31.5–36.5)
MCV RBC AUTO: 88 FL (ref 78–100)
MONOCYTES # BLD AUTO: 1.2 10E9/L (ref 0–1.3)
MONOCYTES NFR BLD AUTO: 7.9 %
NEUTROPHILS # BLD AUTO: 12.8 10E9/L (ref 1.6–8.3)
NEUTROPHILS NFR BLD AUTO: 85.6 %
NITRATE UR QL: NEGATIVE
NRBC # BLD AUTO: 0 10*3/UL
NRBC BLD AUTO-RTO: 0 /100
PH UR STRIP: 6 PH (ref 5–7)
PLATELET # BLD AUTO: 269 10E9/L (ref 150–450)
POTASSIUM SERPL-SCNC: 3.5 MMOL/L (ref 3.4–5.3)
PROT SERPL-MCNC: 7.8 G/DL (ref 6.8–8.8)
RBC # BLD AUTO: 5.08 10E12/L (ref 3.8–5.2)
RSV RNA SPEC QL NAA+PROBE: NEGATIVE
SARS-COV-2 RNA RESP QL NAA+PROBE: NEGATIVE
SODIUM SERPL-SCNC: 137 MMOL/L (ref 133–144)
SOURCE: NORMAL
SP GR UR STRIP: 1.01 (ref 1–1.03)
SPECIMEN SOURCE: NORMAL
SPECIMEN SOURCE: NORMAL
UROBILINOGEN UR STRIP-MCNC: NORMAL MG/DL (ref 0–2)
WBC # BLD AUTO: 15 10E9/L (ref 4–11)

## 2021-03-31 PROCEDURE — 89050 BODY FLUID CELL COUNT: CPT | Performed by: EMERGENCY MEDICINE

## 2021-03-31 PROCEDURE — 85730 THROMBOPLASTIN TIME PARTIAL: CPT | Performed by: EMERGENCY MEDICINE

## 2021-03-31 PROCEDURE — 96374 THER/PROPH/DIAG INJ IV PUSH: CPT | Performed by: EMERGENCY MEDICINE

## 2021-03-31 PROCEDURE — 86140 C-REACTIVE PROTEIN: CPT | Performed by: EMERGENCY MEDICINE

## 2021-03-31 PROCEDURE — 85610 PROTHROMBIN TIME: CPT | Performed by: EMERGENCY MEDICINE

## 2021-03-31 PROCEDURE — 82945 GLUCOSE OTHER FLUID: CPT | Performed by: EMERGENCY MEDICINE

## 2021-03-31 PROCEDURE — 81003 URINALYSIS AUTO W/O SCOPE: CPT | Performed by: EMERGENCY MEDICINE

## 2021-03-31 PROCEDURE — 96361 HYDRATE IV INFUSION ADD-ON: CPT | Performed by: EMERGENCY MEDICINE

## 2021-03-31 PROCEDURE — 87205 SMEAR GRAM STAIN: CPT | Performed by: EMERGENCY MEDICINE

## 2021-03-31 PROCEDURE — 99285 EMERGENCY DEPT VISIT HI MDM: CPT | Mod: 25 | Performed by: EMERGENCY MEDICINE

## 2021-03-31 PROCEDURE — 83690 ASSAY OF LIPASE: CPT | Performed by: EMERGENCY MEDICINE

## 2021-03-31 PROCEDURE — 250N000013 HC RX MED GY IP 250 OP 250 PS 637: Performed by: EMERGENCY MEDICINE

## 2021-03-31 PROCEDURE — 87070 CULTURE OTHR SPECIMN AEROBIC: CPT | Performed by: EMERGENCY MEDICINE

## 2021-03-31 PROCEDURE — 85025 COMPLETE CBC W/AUTO DIFF WBC: CPT | Performed by: EMERGENCY MEDICINE

## 2021-03-31 PROCEDURE — 87015 SPECIMEN INFECT AGNT CONCNTJ: CPT | Performed by: EMERGENCY MEDICINE

## 2021-03-31 PROCEDURE — 87493 C DIFF AMPLIFIED PROBE: CPT | Performed by: EMERGENCY MEDICINE

## 2021-03-31 PROCEDURE — 62270 DX LMBR SPI PNXR: CPT | Performed by: EMERGENCY MEDICINE

## 2021-03-31 PROCEDURE — 80053 COMPREHEN METABOLIC PANEL: CPT | Performed by: EMERGENCY MEDICINE

## 2021-03-31 PROCEDURE — 84157 ASSAY OF PROTEIN OTHER: CPT | Performed by: EMERGENCY MEDICINE

## 2021-03-31 PROCEDURE — 84702 CHORIONIC GONADOTROPIN TEST: CPT | Performed by: EMERGENCY MEDICINE

## 2021-03-31 PROCEDURE — 258N000003 HC RX IP 258 OP 636: Performed by: EMERGENCY MEDICINE

## 2021-03-31 PROCEDURE — C9803 HOPD COVID-19 SPEC COLLECT: HCPCS | Performed by: EMERGENCY MEDICINE

## 2021-03-31 PROCEDURE — 87506 IADNA-DNA/RNA PROBE TQ 6-11: CPT | Performed by: EMERGENCY MEDICINE

## 2021-03-31 PROCEDURE — 93010 ELECTROCARDIOGRAM REPORT: CPT | Mod: 59 | Performed by: EMERGENCY MEDICINE

## 2021-03-31 PROCEDURE — 96375 TX/PRO/DX INJ NEW DRUG ADDON: CPT | Performed by: EMERGENCY MEDICINE

## 2021-03-31 PROCEDURE — 99291 CRITICAL CARE FIRST HOUR: CPT | Mod: 25 | Performed by: EMERGENCY MEDICINE

## 2021-03-31 PROCEDURE — 93005 ELECTROCARDIOGRAM TRACING: CPT | Performed by: EMERGENCY MEDICINE

## 2021-03-31 PROCEDURE — 87636 SARSCOV2 & INF A&B AMP PRB: CPT | Performed by: EMERGENCY MEDICINE

## 2021-03-31 PROCEDURE — 250N000011 HC RX IP 250 OP 636: Performed by: EMERGENCY MEDICINE

## 2021-03-31 PROCEDURE — 85652 RBC SED RATE AUTOMATED: CPT | Performed by: EMERGENCY MEDICINE

## 2021-03-31 PROCEDURE — 83605 ASSAY OF LACTIC ACID: CPT | Performed by: EMERGENCY MEDICINE

## 2021-03-31 PROCEDURE — 83615 LACTATE (LD) (LDH) ENZYME: CPT | Performed by: EMERGENCY MEDICINE

## 2021-03-31 RX ORDER — DROPERIDOL 2.5 MG/ML
5 INJECTION, SOLUTION INTRAMUSCULAR; INTRAVENOUS ONCE
Status: COMPLETED | OUTPATIENT
Start: 2021-03-31 | End: 2021-03-31

## 2021-03-31 RX ORDER — ACETAMINOPHEN 500 MG
1000 TABLET ORAL ONCE
Status: COMPLETED | OUTPATIENT
Start: 2021-03-31 | End: 2021-03-31

## 2021-03-31 RX ORDER — ESCITALOPRAM OXALATE 10 MG/1
10 TABLET ORAL DAILY
COMMUNITY

## 2021-03-31 RX ORDER — KETOROLAC TROMETHAMINE 15 MG/ML
15 INJECTION, SOLUTION INTRAMUSCULAR; INTRAVENOUS ONCE
Status: COMPLETED | OUTPATIENT
Start: 2021-03-31 | End: 2021-03-31

## 2021-03-31 RX ORDER — LIDOCAINE HYDROCHLORIDE AND EPINEPHRINE 10; 10 MG/ML; UG/ML
10 INJECTION, SOLUTION INFILTRATION; PERINEURAL ONCE
Status: COMPLETED | OUTPATIENT
Start: 2021-03-31 | End: 2021-04-01

## 2021-03-31 RX ADMIN — KETOROLAC TROMETHAMINE 15 MG: 15 INJECTION, SOLUTION INTRAMUSCULAR; INTRAVENOUS at 18:12

## 2021-03-31 RX ADMIN — ACETAMINOPHEN 1000 MG: 500 TABLET, FILM COATED ORAL at 22:28

## 2021-03-31 RX ADMIN — DROPERIDOL 5 MG: 2.5 INJECTION, SOLUTION INTRAMUSCULAR; INTRAVENOUS at 22:29

## 2021-03-31 RX ADMIN — SODIUM CHLORIDE, POTASSIUM CHLORIDE, SODIUM LACTATE AND CALCIUM CHLORIDE 1000 ML: 600; 310; 30; 20 INJECTION, SOLUTION INTRAVENOUS at 18:12

## 2021-03-31 ASSESSMENT — ENCOUNTER SYMPTOMS
ARTHRALGIAS: 0
ABDOMINAL PAIN: 0
DIFFICULTY URINATING: 0
COUGH: 0
NECK STIFFNESS: 0
BLOOD IN STOOL: 1
EYE REDNESS: 0
FEVER: 1
VOMITING: 0
COLOR CHANGE: 0
HEADACHES: 1
NERVOUS/ANXIOUS: 1
CONFUSION: 0
SHORTNESS OF BREATH: 0
DIARRHEA: 1

## 2021-03-31 NOTE — ED PROVIDER NOTES
"    [place] EMERGENCY DEPARTMENT ([facility])  March 31, 2021  History     Chief Complaint   Patient presents with     Rectal Bleeding     HPI  Zainab Wells is a 21 year old female who has a PMH of GERD, anemia, and vasovagal syncope, who presents to the Emergency Department from [facility] with bloody diarrhea. Patient reports that started feeling ill at around 2 am last night after going to bed feeling normal. States she then started experiencing repeat episodes of bloody stools (now 12 in total). Describes diarrhea as being mixed with bright red blood. Started having a fever at around 1 PM today (101F at clinic) and went to the [facility]. There she states she was tested for rapid influenza B and a nasopharyngeal Covid-19 which initially both tested positive. The clinic ran the tests a second time because she did not have any known influenza exposures and they had not had very many positive influenza tests at that location. During the 2nd set of tests both came back negative. Patient also reported experiencing a panic attack due to her symptoms.    Here she tells me that she has limited diffuse abdominal discomfort but no focal pain. No vaginal or urinary symptoms. No vomiting. Does endorse a headache which is improving and she attributes to the panic attack from earlier. States that her right hand is numb since earlier today also. No cough or other respiratory symptoms. States that her last blood episode of stools was approximately 30 minutes prior to interview and thinks she will continue to have more. Reports that last night she had a meal of cooked broccoli, pineapple, and noodles.      She has had rectal bleeding the past which she was worked up for at [facility] colorectal with a colonoscopy. She states this was 2 weeks ago and came back \"totally normal\". She didn't have pain after the colonoscopy or recently. Current blood in her stools she states is unlike those previous episodes " of rectal bleeding.    PAST MEDICAL HISTORY:   Past Medical History:   Diagnosis Date     Anemia      GERD (gastroesophageal reflux disease)        PAST SURGICAL HISTORY:   Past Surgical History:   Procedure Laterality Date     ORTHOPEDIC SURGERY Right     shoulder       Past medical history, past surgical history, medications, and allergies were reviewed with the patient. Additional pertinent items: None    FAMILY HISTORY: No family history on file.    SOCIAL HISTORY:   Social History     Tobacco Use     Smoking status: Never Smoker     Smokeless tobacco: Never Used   Substance Use Topics     Alcohol use: No     Social history was reviewed with the patient. Additional pertinent items: Is an environmental science and Romansh student at the [employer]      Patient's Medications   New Prescriptions    No medications on file   Previous Medications    CRISABOROLE (EUCRISA) 2 % OINTMENT    Apply topically 2 times daily    ESCITALOPRAM (LEXAPRO) 10 MG TABLET    Take 10 mg by mouth daily    JUNEL 1/20 1-20 MG-MCG PER TABLET    TAKE 1 TABLET BY MOUTH DAILY FOR 21 DAYS, off FOR 7 DAYS, THEN REPEAT.    LEVONORGESTREL (MIRENA) 20 MCG/24HR IUD    1 each by Intrauterine route once    MINOCYCLINE (MINOCIN/DYNACIN) 50 MG CAPSULE    TAKE 1 CAPSULE BY MOUTH DAILY FOR ACNE.    OMEPRAZOLE (PRILOSEC OTC) 20 MG EC TABLET    Take 20 mg by mouth daily    PYRIDOXINE HCL (VITAMIN B6 PO)    Take 250 mg by mouth daily    TACROLIMUS (PROTOPIC) 0.1 % OINTMENT    APPLY TOPICALLY TWICE DAILY TO ECZEMA ON FACE.    VITAMIN D, CHOLECALCIFEROL, PO    Take 5,000 Units by mouth daily   Modified Medications    No medications on file   Discontinued Medications    CITALOPRAM (CELEXA) 10 MG TABLET    Take 20 mg by mouth daily           Allergies   Allergen Reactions     Bactrim [Sulfamethoxazole W/Trimethoprim]         Review of Systems   Constitutional: Positive for fever.   HENT: Negative for congestion.    Eyes: Negative for redness.    Respiratory: Negative for cough and shortness of breath.    Cardiovascular: Negative for chest pain.   Gastrointestinal: Positive for blood in stool and diarrhea. Negative for abdominal pain and vomiting.   Genitourinary: Negative for difficulty urinating.   Musculoskeletal: Negative for arthralgias and neck stiffness.   Skin: Negative for color change.   Neurological: Positive for headaches. Seizures: R hand.   Psychiatric/Behavioral: Negative for confusion. The patient is nervous/anxious.    All other systems reviewed and are negative.    A complete review of systems was performed with pertinent positives and negatives noted in the HPI, and all other systems negative.    Physical Exam   BP: 128/85  Pulse: 104  Temp: 101.7  F (38.7  C)  Resp: 18  Weight: 77.1 kg (170 lb)  SpO2: 100 %      Physical Exam  Vitals signs and nursing note reviewed.   Constitutional:       General: She is not in acute distress.     Appearance: Normal appearance. She is obese. She is ill-appearing. She is not toxic-appearing.   HENT:      Head: Normocephalic and atraumatic.      Mouth/Throat:      Mouth: Mucous membranes are moist.      Pharynx: Oropharynx is clear. No oropharyngeal exudate.   Eyes:      Extraocular Movements: Extraocular movements intact.      Pupils: Pupils are equal, round, and reactive to light.   Neck:      Musculoskeletal: Normal range of motion and neck supple. No neck rigidity.   Neurological:      Mental Status: She is alert.         ED Course   5:41 PM  The patient was seen and examined by Vic Sandoval DO in Room ED09.       [facility]    -Lumbar Puncture    Date/Time: 4/28/2021 8:00 PM  Performed by: Alejo Sandoval MD  Authorized by: Alejo Sandoval MD       PRE-PROCEDURE DETAILS:     Procedure purpose:  Diagnostic    ANESTHESIA (see MAR for exact dosages):     Anesthesia method:  Local infiltration    Local anesthetic:  Lidocaine 1% w/o epi      PROCEDURE  DETAILS:     Lumbar space:  L4-L5 interspace    Patient position:  Sitting    Needle gauge:  20    Needle type:  Spinal needle - Quincke tip    Needle length (in):  3.5    Ultrasound guidance: no      Number of attempts:  1    Fluid appearance:  Clear    Tubes of fluid:  4    Total volume (ml):  8    POST-PROCEDURE:     Puncture site:  Adhesive bandage applied      PROCEDURE   Patient Tolerance:  Patient tolerated the procedure well with no immediate complications                   EKG Interpretation:      Interpreted by Alejo Sandoval MD  Time reviewed:   Symptoms at time of EKG: headache   Rhythm: normal sinus   Rate: normal  Axis: normal  Ectopy: none  Conduction: normal  ST Segments/ T Waves: No ST-T wave changes  Q Waves: none  Comparison to prior: No old EKG available    Clinical Impression: normal EKG          Critical Care Addendum    My initial assessment, based on my review of nursing observations, review of vital signs, focused history, physical exam and review of cardiac rhythm monitor, established that Zainab Wells has suspicion for sepsis and need for evaluation and early goal-directed therapy, which requires immediate intervention, and therefore she is critically ill.     After the initial assessment, the care team initiated multiple lab tests, initiated IV fluid administration, initiated medication therapy with droperidol, toradol, acetaminophen and performed lumbar puncture to provide stabilization care. Due to the critical nature of this patient, I reassessed vital signs, physical exam, review of cardiac rhythm monitor and 12 lead ECG analysis multiple times prior to her disposition.     Time also spent performing documentation, reviewing test results and coordination of care.     Critical care time (excluding teaching time and procedures): 39 minutes.              Results for orders placed or performed during the hospital encounter of 03/31/21 (from the past 24 hour(s))   CBC with platelets  differential   Result Value Ref Range    WBC 15.0 (H) 4.0 - 11.0 10e9/L    RBC Count 5.08 3.8 - 5.2 10e12/L    Hemoglobin 14.9 11.7 - 15.7 g/dL    Hematocrit 44.6 35.0 - 47.0 %    MCV 88 78 - 100 fl    MCH 29.3 26.5 - 33.0 pg    MCHC 33.4 31.5 - 36.5 g/dL    RDW 11.9 10.0 - 15.0 %    Platelet Count 269 150 - 450 10e9/L    Diff Method Automated Method     % Neutrophils 85.6 %    % Lymphocytes 5.6 %    % Monocytes 7.9 %    % Eosinophils 0.3 %    % Basophils 0.3 %    % Immature Granulocytes 0.3 %    Nucleated RBCs 0 0 /100    Absolute Neutrophil 12.8 (H) 1.6 - 8.3 10e9/L    Absolute Lymphocytes 0.8 0.8 - 5.3 10e9/L    Absolute Monocytes 1.2 0.0 - 1.3 10e9/L    Absolute Eosinophils 0.0 0.0 - 0.7 10e9/L    Absolute Basophils 0.1 0.0 - 0.2 10e9/L    Abs Immature Granulocytes 0.1 0 - 0.4 10e9/L    Absolute Nucleated RBC 0.0    INR   Result Value Ref Range    INR 1.07 0.86 - 1.14   Partial thromboplastin time   Result Value Ref Range    PTT 33 22 - 37 sec   Comprehensive metabolic panel   Result Value Ref Range    Sodium 137 133 - 144 mmol/L    Potassium 3.5 3.4 - 5.3 mmol/L    Chloride 104 94 - 109 mmol/L    Carbon Dioxide 26 20 - 32 mmol/L    Anion Gap 7 3 - 14 mmol/L    Glucose 84 70 - 99 mg/dL    Urea Nitrogen 5 (L) 7 - 30 mg/dL    Creatinine 0.83 0.52 - 1.04 mg/dL    GFR Estimate >90 >60 mL/min/[1.73_m2]    GFR Estimate If Black >90 >60 mL/min/[1.73_m2]    Calcium 9.6 8.5 - 10.1 mg/dL    Bilirubin Total 0.6 0.2 - 1.3 mg/dL    Albumin 4.3 3.4 - 5.0 g/dL    Protein Total 7.8 6.8 - 8.8 g/dL    Alkaline Phosphatase 68 40 - 150 U/L    ALT 18 0 - 50 U/L    AST 15 0 - 45 U/L   Lipase   Result Value Ref Range    Lipase 71 (L) 73 - 393 U/L   CRP inflammation   Result Value Ref Range    CRP Inflammation 22.0 (H) 0.0 - 8.0 mg/L   Erythrocyte sedimentation rate auto   Result Value Ref Range    Sed Rate 5 0 - 20 mm/h   HCG quantitative pregnancy   Result Value Ref Range    HCG Quantitative Serum <1 0 - 5 IU/L   Lactate  Dehydrogenase   Result Value Ref Range    Lactate Dehydrogenase 182 81 - 234 U/L   Lactic acid whole blood   Result Value Ref Range    Lactic Acid 1.3 0.7 - 2.0 mmol/L   Symptomatic Influenza A/B & SARS-CoV2 (COVID-19) Virus PCR Multiplex    Specimen: Nasopharyngeal   Result Value Ref Range    Flu A/B & SARS-COV-2 PCR Source Nasopharyngeal     SARS-CoV-2 PCR Result NEGATIVE     Influenza A PCR Negative NEG^Negative    Influenza B PCR Negative NEG^Negative    Respiratory Syncytial Virus PCR Negative NEG^Negative    Flu A/B & SARS-CoV-2 PCR Comment (Note)    UA reflex to Microscopic and Culture    Specimen: Urine Midstream; Midstream Urine   Result Value Ref Range    Color Urine Straw     Appearance Urine Clear     Glucose Urine Negative NEG^Negative mg/dL    Bilirubin Urine Negative NEG^Negative    Ketones Urine Negative NEG^Negative mg/dL    Specific Gravity Urine 1.006 1.003 - 1.035    Blood Urine Negative NEG^Negative    pH Urine 6.0 5.0 - 7.0 pH    Protein Albumin Urine Negative NEG^Negative mg/dL    Urobilinogen mg/dL Normal 0.0 - 2.0 mg/dL    Nitrite Urine Negative NEG^Negative    Leukocyte Esterase Urine Negative NEG^Negative    Source Midstream Urine    Clostridium difficile toxin B PCR    Specimen: Feces   Result Value Ref Range    Specimen Description Feces     C Diff Toxin B PCR Negative NEG^Negative   Glucose CSF: Tube 1   Result Value Ref Range    Glucose CSF 53 40 - 70 mg/dL   Protein total CSF: Tube 1   Result Value Ref Range    Protein Total CSF 29 15 - 60 mg/dL   Gram stain    Specimen: Cerebral spinal fluid; Cerebrospinal fluid    TUBE 2   Result Value Ref Range    Specimen Description Cerebrospinal fluid TUBE 2     Gram Stain No organisms seen     Gram Stain       Rare  WBC'S seen  predominantly mononuclear cells      Gram Stain       Critical Value/Significant Value called to and read back by  Arielle Daniels RN @ 0045 4/1/21 TM.      Gram Stain       Quantification of host cells and  microbiological organisms was done on a cytocentrifuged   preparation.     CSF Culture Aerobic Bacterial    Specimen: Cerebral spinal fluid; Cerebrospinal fluid    TUBE 2   Result Value Ref Range    Specimen Description Cerebrospinal fluid TUBE 2     Culture Micro PENDING    Cell count with differential CSF: Tube 4   Result Value Ref Range    WBC CSF 3 0 - 5 /uL    RBC CSF 0 0 - 2 /uL    Tube Number 4 #    Color CSF Colorless CLRL^Colorless    Appearance CSF Clear CLER^Clear   Cell count with differential CSF: Tube 1   Result Value Ref Range    WBC CSF Canceled, Test credited 0 - 5 /uL    RBC CSF Canceled, Test credited 0 - 2 /uL     Medications   lactated ringers BOLUS 1,000 mL (has no administration in time range)   ketorolac (TORADOL) injection 15 mg (15 mg Intravenous Given 3/31/21 1812)   lactated ringers BOLUS 1,000 mL (0 mLs Intravenous Stopped 3/31/21 1930)   droperidol (INAPSINE) injection 5 mg (5 mg Intravenous Given 3/31/21 2229)   acetaminophen (TYLENOL) tablet 1,000 mg (1,000 mg Oral Given 3/31/21 2228)   lidocaine 1% with EPINEPHrine 1:100,000 injection 10 mL (10 mLs Intradermal Given by Other Clinician 4/1/21 0047)             Assessments & Plan (with Medical Decision Making)     21-year-old female here with bloody diarrhea, fever, headache.  Blade area started this morning, from then until now roughly 15 episodes of bloody diarrhea.  Fever and headache started around 1:00.  Went to [facility], was tested negative for Covid, first test was positive second test was negative.  Test here negative.  Given absence of any definitive infection, which consisted of urinalysis, did not get chest x-ray as lungs were clear, belly 100% soft so no need for CT abdomen pelvis, performed lumbar puncture as her headache was worsening.  This is completely normal.  C. difficile negative.  Infectious stool antigen test will not be done until the morning as informed to me by micro, therefore will put patient  in obvious until test are back.  I am getting a CT head because her headache is still severe, low suspicion for intracranial abscess but a CT should be sufficient.  Will then go to ED opts to be discharged after her infectious diarrhea panel back.    I have reviewed the nursing notes.    I have reviewed the findings, diagnosis, plan and need for follow up with the patient.    New Prescriptions    No medications on file       Final diagnoses:   Acute intractable tension-type headache   Hematochezia   Fever and chills     I, Lalo Crook, am serving as a trained medical scribe to document services personally performed by Vic Sandoval DO, based on the provider's statements to me.   I, Vic Sandoval DO, was physically present and have reviewed and verified the accuracy of this note documented by Lalo Crook.     3/31/2021   [facility] EMERGENCY DEPARTMENT     Alejo Sandoval MD  04/01/21 0116       Alejo Sandoval MD  04/28/21 2001

## 2021-03-31 NOTE — ED TRIAGE NOTES
Pt brought in from [facility] for rectal bleeding. While at clinic, pt had a fever so they ran a COVID swab. Came back psoitive for COVID and influenza B. They re-ran tests and both came back negative. On presentation to ED, pt is A+Ox4, c/o abd discomfort, and lethargy.

## 2021-04-01 ENCOUNTER — APPOINTMENT (OUTPATIENT)
Dept: CT IMAGING | Facility: CLINIC | Age: 21
End: 2021-04-01
Attending: EMERGENCY MEDICINE
Payer: COMMERCIAL

## 2021-04-01 VITALS
WEIGHT: 170 LBS | TEMPERATURE: 97.6 F | HEART RATE: 70 BPM | RESPIRATION RATE: 19 BRPM | SYSTOLIC BLOOD PRESSURE: 124 MMHG | BODY MASS INDEX: 25.85 KG/M2 | DIASTOLIC BLOOD PRESSURE: 84 MMHG | OXYGEN SATURATION: 99 %

## 2021-04-01 PROBLEM — K92.1 HEMATOCHEZIA: Status: ACTIVE | Noted: 2021-04-01

## 2021-04-01 PROBLEM — R50.9 FEVER AND CHILLS: Status: ACTIVE | Noted: 2021-04-01

## 2021-04-01 PROBLEM — G44.201 ACUTE INTRACTABLE TENSION-TYPE HEADACHE: Status: ACTIVE | Noted: 2021-04-01

## 2021-04-01 LAB
ANION GAP SERPL CALCULATED.3IONS-SCNC: 6 MMOL/L (ref 3–14)
APPEARANCE CSF: CLEAR
BASOPHILS # BLD AUTO: 0 10E9/L (ref 0–0.2)
BASOPHILS NFR BLD AUTO: 0.2 %
BUN SERPL-MCNC: 9 MG/DL (ref 7–30)
C COLI+JEJUNI+LARI FUSA STL QL NAA+PROBE: NOT DETECTED
CALCIUM SERPL-MCNC: 8.6 MG/DL (ref 8.5–10.1)
CHLORIDE SERPL-SCNC: 108 MMOL/L (ref 94–109)
CO2 SERPL-SCNC: 26 MMOL/L (ref 20–32)
COLOR CSF: COLORLESS
CREAT SERPL-MCNC: 0.79 MG/DL (ref 0.52–1.04)
DIFFERENTIAL METHOD BLD: ABNORMAL
EC STX1 GENE STL QL NAA+PROBE: NOT DETECTED
EC STX2 GENE STL QL NAA+PROBE: NOT DETECTED
ENTERIC PATHOGEN COMMENT: ABNORMAL
EOSINOPHIL # BLD AUTO: 0 10E9/L (ref 0–0.7)
EOSINOPHIL NFR BLD AUTO: 0 %
ERYTHROCYTE [DISTWIDTH] IN BLOOD BY AUTOMATED COUNT: 11.8 % (ref 10–15)
GFR SERPL CREATININE-BSD FRML MDRD: >90 ML/MIN/{1.73_M2}
GLUCOSE CSF-MCNC: 53 MG/DL (ref 40–70)
GLUCOSE SERPL-MCNC: 107 MG/DL (ref 70–99)
GRAM STN SPEC: NORMAL
HCT VFR BLD AUTO: 40.3 % (ref 35–47)
HGB BLD-MCNC: 13.8 G/DL (ref 11.7–15.7)
IMM GRANULOCYTES # BLD: 0.1 10E9/L (ref 0–0.4)
IMM GRANULOCYTES NFR BLD: 0.5 %
LACTATE BLD-SCNC: 1 MMOL/L (ref 0.7–2)
LYMPHOCYTES # BLD AUTO: 1.2 10E9/L (ref 0.8–5.3)
LYMPHOCYTES NFR BLD AUTO: 7 %
MCH RBC QN AUTO: 29.6 PG (ref 26.5–33)
MCHC RBC AUTO-ENTMCNC: 34.2 G/DL (ref 31.5–36.5)
MCV RBC AUTO: 86 FL (ref 78–100)
MONOCYTES # BLD AUTO: 1.3 10E9/L (ref 0–1.3)
MONOCYTES NFR BLD AUTO: 7.7 %
NEUTROPHILS # BLD AUTO: 14.4 10E9/L (ref 1.6–8.3)
NEUTROPHILS NFR BLD AUTO: 84.6 %
NOROV GI+II ORF1-ORF2 JNC STL QL NAA+PR: NOT DETECTED
NRBC # BLD AUTO: 0 10*3/UL
NRBC BLD AUTO-RTO: 0 /100
PLATELET # BLD AUTO: 224 10E9/L (ref 150–450)
POTASSIUM SERPL-SCNC: 3.5 MMOL/L (ref 3.4–5.3)
PROT CSF-MCNC: 29 MG/DL (ref 15–60)
RBC # BLD AUTO: 4.67 10E12/L (ref 3.8–5.2)
RBC # CSF MANUAL: 0 /UL (ref 0–2)
RBC # CSF MANUAL: NORMAL /UL (ref 0–2)
RVA NSP5 STL QL NAA+PROBE: NOT DETECTED
SALMONELLA SP RPOD STL QL NAA+PROBE: NOT DETECTED
SHIGELLA SP+EIEC IPAH STL QL NAA+PROBE: ABNORMAL
SODIUM SERPL-SCNC: 140 MMOL/L (ref 133–144)
SPECIMEN SOURCE: NORMAL
TUBE # CSF: 4 #
V CHOL+PARA RFBL+TRKH+TNAA STL QL NAA+PR: NOT DETECTED
WBC # BLD AUTO: 17 10E9/L (ref 4–11)
WBC # CSF MANUAL: 3 /UL (ref 0–5)
WBC # CSF MANUAL: NORMAL /UL (ref 0–5)
Y ENTERO RECN STL QL NAA+PROBE: NOT DETECTED

## 2021-04-01 PROCEDURE — 99219 PR INITIAL OBSERVATION CARE,LEVEL II: CPT | Performed by: INTERNAL MEDICINE

## 2021-04-01 PROCEDURE — 99217 PR OBSERVATION CARE DISCHARGE: CPT | Performed by: INTERNAL MEDICINE

## 2021-04-01 PROCEDURE — G0378 HOSPITAL OBSERVATION PER HR: HCPCS

## 2021-04-01 PROCEDURE — 70450 CT HEAD/BRAIN W/O DYE: CPT | Mod: 26 | Performed by: RADIOLOGY

## 2021-04-01 PROCEDURE — 80048 BASIC METABOLIC PNL TOTAL CA: CPT | Performed by: INTERNAL MEDICINE

## 2021-04-01 PROCEDURE — 83605 ASSAY OF LACTIC ACID: CPT | Performed by: EMERGENCY MEDICINE

## 2021-04-01 PROCEDURE — 258N000003 HC RX IP 258 OP 636: Performed by: INTERNAL MEDICINE

## 2021-04-01 PROCEDURE — 258N000003 HC RX IP 258 OP 636: Performed by: EMERGENCY MEDICINE

## 2021-04-01 PROCEDURE — 99207 PR CDG-CODE CATEGORY CHANGED: CPT | Performed by: INTERNAL MEDICINE

## 2021-04-01 PROCEDURE — 36415 COLL VENOUS BLD VENIPUNCTURE: CPT | Performed by: INTERNAL MEDICINE

## 2021-04-01 PROCEDURE — 85025 COMPLETE CBC W/AUTO DIFF WBC: CPT | Performed by: INTERNAL MEDICINE

## 2021-04-01 PROCEDURE — 250N000009 HC RX 250: Performed by: EMERGENCY MEDICINE

## 2021-04-01 PROCEDURE — 96361 HYDRATE IV INFUSION ADD-ON: CPT

## 2021-04-01 PROCEDURE — 250N000013 HC RX MED GY IP 250 OP 250 PS 637: Performed by: INTERNAL MEDICINE

## 2021-04-01 PROCEDURE — 70450 CT HEAD/BRAIN W/O DYE: CPT

## 2021-04-01 RX ORDER — ONDANSETRON 4 MG/1
4 TABLET, ORALLY DISINTEGRATING ORAL EVERY 6 HOURS PRN
Qty: 20 TABLET | Refills: 0 | Status: SHIPPED | OUTPATIENT
Start: 2021-04-01

## 2021-04-01 RX ORDER — ONDANSETRON 2 MG/ML
4 INJECTION INTRAMUSCULAR; INTRAVENOUS EVERY 6 HOURS PRN
Status: DISCONTINUED | OUTPATIENT
Start: 2021-04-01 | End: 2021-04-01 | Stop reason: HOSPADM

## 2021-04-01 RX ORDER — ACETAMINOPHEN 325 MG/1
650 TABLET ORAL EVERY 4 HOURS PRN
Status: DISCONTINUED | OUTPATIENT
Start: 2021-04-01 | End: 2021-04-01 | Stop reason: HOSPADM

## 2021-04-01 RX ORDER — PROCHLORPERAZINE MALEATE 5 MG
10 TABLET ORAL EVERY 6 HOURS PRN
Status: DISCONTINUED | OUTPATIENT
Start: 2021-04-01 | End: 2021-04-01 | Stop reason: HOSPADM

## 2021-04-01 RX ORDER — ACETAMINOPHEN 325 MG/1
650 TABLET ORAL EVERY 4 HOURS PRN
Qty: 30 TABLET | Refills: 0 | Status: SHIPPED | OUTPATIENT
Start: 2021-04-01

## 2021-04-01 RX ORDER — ONDANSETRON 4 MG/1
4 TABLET, ORALLY DISINTEGRATING ORAL EVERY 6 HOURS PRN
Status: DISCONTINUED | OUTPATIENT
Start: 2021-04-01 | End: 2021-04-01 | Stop reason: HOSPADM

## 2021-04-01 RX ORDER — PROCHLORPERAZINE 25 MG
25 SUPPOSITORY, RECTAL RECTAL EVERY 12 HOURS PRN
Status: DISCONTINUED | OUTPATIENT
Start: 2021-04-01 | End: 2021-04-01 | Stop reason: HOSPADM

## 2021-04-01 RX ORDER — CHLORAL HYDRATE 500 MG
1 CAPSULE ORAL DAILY
COMMUNITY

## 2021-04-01 RX ORDER — IBUPROFEN 400 MG/1
400 TABLET, FILM COATED ORAL EVERY 4 HOURS PRN
Status: DISCONTINUED | OUTPATIENT
Start: 2021-04-01 | End: 2021-04-01 | Stop reason: HOSPADM

## 2021-04-01 RX ORDER — ACETAMINOPHEN 650 MG/1
650 SUPPOSITORY RECTAL EVERY 4 HOURS PRN
Status: DISCONTINUED | OUTPATIENT
Start: 2021-04-01 | End: 2021-04-01

## 2021-04-01 RX ORDER — SODIUM CHLORIDE, SODIUM LACTATE, POTASSIUM CHLORIDE, CALCIUM CHLORIDE 600; 310; 30; 20 MG/100ML; MG/100ML; MG/100ML; MG/100ML
INJECTION, SOLUTION INTRAVENOUS CONTINUOUS
Status: DISCONTINUED | OUTPATIENT
Start: 2021-04-01 | End: 2021-04-01 | Stop reason: HOSPADM

## 2021-04-01 RX ORDER — ESCITALOPRAM OXALATE 10 MG/1
10 TABLET ORAL DAILY
Status: DISCONTINUED | OUTPATIENT
Start: 2021-04-01 | End: 2021-04-01 | Stop reason: HOSPADM

## 2021-04-01 RX ADMIN — SODIUM CHLORIDE, POTASSIUM CHLORIDE, SODIUM LACTATE AND CALCIUM CHLORIDE 1000 ML: 600; 310; 30; 20 INJECTION, SOLUTION INTRAVENOUS at 01:23

## 2021-04-01 RX ADMIN — LIDOCAINE HYDROCHLORIDE AND EPINEPHRINE 10 ML: 10; 10 INJECTION, SOLUTION INFILTRATION; PERINEURAL at 00:47

## 2021-04-01 RX ADMIN — ESCITALOPRAM OXALATE 10 MG: 10 TABLET ORAL at 09:16

## 2021-04-01 RX ADMIN — ACETAMINOPHEN 650 MG: 325 TABLET, FILM COATED ORAL at 11:26

## 2021-04-01 RX ADMIN — SODIUM CHLORIDE, POTASSIUM CHLORIDE, SODIUM LACTATE AND CALCIUM CHLORIDE: 600; 310; 30; 20 INJECTION, SOLUTION INTRAVENOUS at 06:19

## 2021-04-01 NOTE — PLAN OF CARE
VS: Vital signs:  Temp: 100.3  F (37.9  C) Temp src: Oral BP: 108/60 Pulse: 93   Resp: 18 SpO2: 99 % O2 Device: None (Room air)        O2: >90% on RA, lung sounds clear, denies SOB/chest pain   Output: Voiding spontaneously without difficulty   Last BM: LBM 4/1 before arrival on unit and watery/liquid per pt report   Activity: Repositioning independently in bed   Skin: Visible skin intact   Pain: Denies   CMS: A&O x4, denies numbness/tingling, pulses 2+   Dressing: PIV dressing CDI   Diet: Regular, denies N/V   LDA: PIV R forearm infusing LR at 100mL/hr, unhooked self to go to bathroom near end of shift, reattached by AM RN   Equipment: IV pole   Plan: Continue to monitor   Additional Info: Enteric precautions for shigella

## 2021-04-01 NOTE — PLAN OF CARE
/84 (BP Location: Left arm)   Pulse 70   Temp 97.6  F (36.4  C) (Oral)   Resp 19   Wt 77.1 kg (170 lb)   SpO2 99%   BMI 25.85 kg/m   on RA. Aox4. Total of 3 loose stools today.     Obs note for 1600:  -diagnostic tests and consults completed and resulted met  -vital signs normal or at patient baseline met  -tolerating oral intake to maintain hydration met  Nurse to notify provider when observation goals have been met and patient is ready for discharge. Provider notified and told RN that discharge medications are not needed (tylenol, zofran). Patient given discharge education regarding precautions and hand hygiene to follow when experiencing gastroenteritis. Pt declined discharge medications, stated she had tylenol at home and that she does not need zofran as she is not nauseous. Pt verbalized understanding of discharge education and left unit 8a with her belongings to go back to [facility] [employer] at 1645.

## 2021-04-01 NOTE — CONSULTS
Care Management Initial Consult    General Information  Assessment completed with:  patient       Primary Care Provider verified and updated as needed:   Has PCP at [facility]  Readmission within the last 30 days:  No         Advance Care Planning:     Not addressed       Communication Assessment  Patient's communication style: spoken language (English or Bilingual)    Hearing Difficulty or Deaf: no   Wear Glasses or Blind: yes    Cognitive  Cognitive/Neuro/Behavioral: WDL                      Living Environment:   People in home: 1  Current living Arrangements:      Live in single dorm room at [facility]  Able to return to prior arrangements:  Yes       Family/Social Support:  Care provided by:  self  Provides care for: No one else                 Description of Support System:    family       Current Resources:   Patient receiving home care services:  No     Community Resources:    Equipment currently used at home:  None  Supplies currently used at home:  None    Employment/Financial:  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 [employer]     Financial Concerns:   Not at this time          Lifestyle & Psychosocial Needs:        Socioeconomic History     Marital status: Single     Spouse name: Not on file     Number of children: Not on file     Years of education: Not on file     Highest education level: Not on file     Tobacco Use     Smoking status: Never Smoker     Smokeless tobacco: Never Used   Substance and Sexual Activity     Alcohol use: No     Drug use: No       Functional Status:  Prior to admission patient needed assistance:          Independant    Mental Health Status:      WNL    Chemical Dependency Status:            No noted issues    Values/Beliefs:  Spiritual, Cultural Beliefs, Congregational Practices, Values that affect care:      NO          Additional Information:  [facility] RNCC spoke with patient via phone, states she lives in a dorm  on the [address] and eats in the dining gibbs. Informed patient that the [facility] has been notified by law, awaiting to hear back what precautions are needed before DC back into community. Patient verbalized understanding.  Patient states she just lives down the street and will walk back home when discharged. Patient sates she feels somehat better , has a headache and still has diarrhea.    Call placed x 2 to [facility]  [phone]-awaiting call back to report DX and when patient can return to school.    12:05 PM Addendum-Left another message for the department of [facility] with request for CB    1:34 PM Addendum-received call from Mark at [facility] Department  [phone], was informed that [facility] has already reported this as it is their responsibility. 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 The [place] department [facility] will reach out to patient to do an exposure interview.  Inquired as to what ABX patient would discharge on.?    Mariangel ASHRAFN RN CCM  RN Care Coordinator [address] [place] [id number]  [email or web address]   Office: [phone]   Pager: [phone]    To contact Weekend RNCC, dial [phone] and enter job code [phone] at prompt. This pager can not be contacted by text page or outside line.

## 2021-04-01 NOTE — H&P
[facility]    History and Physical - Hospitalist Service       Date of Admission:  3/31/2021    Assessment & Plan   Zainab Wells is a 21 year old female admitted on 3/31/2021 for hematochezia, headache, and dehydration secondary to Shigella gastroenteritis.    Hematochezia, Shigella gastroenteritis, dehydration:  - LR @ 100ml/hr  - Report to department of health in AM  - Consider antibiotics if clinically worsening/unimproved with IV hydration    Headache: Acetaminophen and ibuprofen PRN    Depression: Continue escitalopram     Diet: Regular Diet Adult    DVT Prophylaxis: Low Risk/Ambulatory with no VTE prophylaxis indicated  Venegas Catheter: not present  Code Status: Full Code           Disposition Plan   Expected discharge: Tomorrow, recommended to prior living arrangement once adequate pain management/ tolerating PO medications.  Entered: Kleber Zurita MD 04/01/2021, 5:59 AM     The patient's care was discussed with the Bedside Nurse and Patient.    Kleber Zurita MD  [facility]  Contact information available via [facility] Paging/Directory      ______________________________________________________________________    Chief Complaint   Bloody diarrhea    History is obtained from the patient    History of Present Illness   Zainab Wells is a 21 year old female who presents with bloody diarrhea. She started having diarrhea yesterday morning around 2am. She began having episodes of bloody diarrhea (bright blood mixed into the stool) and mild abdominal cramping. Also started having a fever a few hours later. Additionally, has been having a moderate headache. No vomiting, no cough. She has not had any new foods or exposures to people with similar symptoms.     Review of Systems    The 10 point Review of Systems is negative other than noted in the HPI or here.    Past Medical History    I have reviewed this  patient's medical history and updated it with pertinent information if needed.   Past Medical History:   Diagnosis Date     Anemia      GERD (gastroesophageal reflux disease)        Past Surgical History   I have reviewed this patient's surgical history and updated it with pertinent information if needed.  Past Surgical History:   Procedure Laterality Date     ORTHOPEDIC SURGERY Right     shoulder       Social History   I have reviewed this patient's social history and updated it with pertinent information if needed.  Social History     Tobacco Use     Smoking status: Never Smoker     Smokeless tobacco: Never Used   Substance Use Topics     Alcohol use: No     Drug use: No       Family History     No family history of GI problems    Prior to Admission Medications   Prior to Admission Medications   Prescriptions Last Dose Informant Patient Reported? Taking?   escitalopram (LEXAPRO) 10 MG tablet   Yes Yes   Sig: Take 10 mg by mouth daily   levonorgestrel (MIRENA) 20 MCG/24HR IUD   Yes No   Si each by Intrauterine route once      Facility-Administered Medications: None     Allergies   Allergies   Allergen Reactions     Bactrim [Sulfamethoxazole W/Trimethoprim]        Physical Exam   Vital Signs: Temp: 99.2  F (37.3  C) Temp src: Oral BP: 116/61 Pulse: 94   Resp: 13 SpO2: 95 % O2 Device: None (Room air)    Weight: 170 lbs 0 oz    Constitutional: awake, alert, cooperative, no apparent distress, and appears stated age  Eyes: Lids and lashes normal, pupils equal, round and reactive to light, extra ocular muscles intact, sclera clear, conjunctiva normal  ENT: Normocephalic, without obvious abnormality, atraumatic, sinuses nontender on palpation, external ears without lesions, oral pharynx with moist mucous membranes, tonsils without erythema or exudates, gums normal and good dentition.  Hematologic / Lymphatic: no cervical lymphadenopathy and no supraclavicular lymphadenopathy  Respiratory: No increased work of  breathing, good air exchange, clear to auscultation bilaterally, no crackles or wheezing  Cardiovascular: Normal apical impulse, regular rate and rhythm, normal S1 and S2, no S3 or S4, and no murmur noted  GI: Nondistended, hyperactive bowel sounds, mild tenderness in abdomen without rebound tenderness/guarding.  Skin: no bruising or bleeding    Data   Data reviewed today: I reviewed all medications, new labs and imaging results over the last 24 hours.

## 2021-04-01 NOTE — PHARMACY-ADMISSION MEDICATION HISTORY
Admission Medication History Completed by Pharmacy    See [facility] Admission Navigator for allergy information, preferred outpatient pharmacy, prior to admission medications and immunization status.     Medication History Sources:     Patient    Changes made to PTA medication list (reason):    Added: vitamin D, fish oil per patient    Deleted: None    Changed: None    Additional Information:    Patient is a good medication historian     Prior to Admission medications    Medication Sig Last Dose Taking? Auth Provider   cholecalciferol 25 MCG (1000 UT) TABS Take 1,000 Units by mouth daily 3/30/2021 Yes Unknown, Entered By History   escitalopram (LEXAPRO) 10 MG tablet Take 10 mg by mouth daily 3/30/2021 Yes Reported, Patient   fish oil-omega-3 fatty acids 1000 MG capsule Take 1 g by mouth daily 3/30/2021 Yes Unknown, Entered By History   levonorgestrel (MIRENA) 20 MCG/24HR IUD 1 each by Intrauterine route once Unknown at Unknown time  Reported, Patient       Date completed: 04/01/21    Medication history completed by: Kevin Witt RPH

## 2021-04-01 NOTE — PLAN OF CARE
"Pt feeling \"better\". Up to bathroom with SBA, voiding well, still having loose stool but not bloody. Ordered some food but ate only bites. Pt said she has no appetite. No tenderness in abd. No nausea. 13:45 pt said she felt like she could go, was feeling better. MD notified.   "

## 2021-04-01 NOTE — DISCHARGE SUMMARY
Discharge Summary    Zainab Wells MRN# [id number]   YOB: 2000 Age: 21 year old     Date of Admission:  3/31/2021  Date of Discharge:  4/1/2021  5:00 PM  Admitting Physician:  Kleber Zurita MD  Discharge Physician:  Ernesto Miranda MD   Discharging Service:  Internal Medicine     Primary Provider: [facility]          Discharge Diagnosis:   Shigella gastroenteritis              Brief History of Illness:     Zainab Wells is a 21 year old female who was admitted for bloody diarrhea    For more details, please refer to the admission H&P on 3/31/2021             Hospital Course:     Zainab Wells is a 21 year old female admitted on 3/31/2021 for hematochezia, headache, and dehydration secondary to Shigella gastroenteritis.     Hematochezia, Shigella gastroenteritis, dehydration:  She was give IV fluids. Patient reports she is feeling much better. Diarrhea has slowed down. Had one bowel movement with no blood, and later had small in the other one. She denies any nausea, vomiting. She tolerated diet.   She was offered to stay day for further monitoring, she felt comfortable she would able to take care of herself at home, and drink enough fluids.   Case also discussed with ID over the phone, as she is improving clinically no indication for antibiotics use.   White count slightly high, expect to improve  Department of health notified by Care coordinator       Headache: Acetaminophen and ibuprofen PRN     Depression: Continue escitalopram        Diet: Regular Diet Adult    DVT Prophylaxis: Low Risk/Ambulatory with no VTE prophylaxis indicated  Venegas Catheter: not present  Code Status: Full Code         Discharge Medications:     Discharge Medication List as of 4/1/2021  4:32 PM      START taking these medications    Details   acetaminophen (TYLENOL) 325 MG tablet Take 2 tablets (650 mg) by mouth every 4 hours as needed for mild pain, Disp-30 tablet, R-0, E-Prescribe       ondansetron (ZOFRAN-ODT) 4 MG ODT tab Take 1 tablet (4 mg) by mouth every 6 hours as needed for nausea or vomiting, Disp-20 tablet, R-0, E-Prescribe         CONTINUE these medications which have NOT CHANGED    Details   cholecalciferol 25 MCG (1000 UT) TABS Take 1,000 Units by mouth daily, Historical      escitalopram (LEXAPRO) 10 MG tablet Take 10 mg by mouth daily, Historical      fish oil-omega-3 fatty acids 1000 MG capsule Take 1 g by mouth daily, Historical      levonorgestrel (MIRENA) 20 MCG/24HR IUD 1 each by Intrauterine route onceHistorical                 Procedures/Consultations:   No procedures performed during this admission  No consultations were requested during this admission           Final Day of Progress before Discharge:     Reports doing well  No nausea, vomiting, abdominal pain  Tolerated diet  Having loose stool reports  Things have slowed down    Physical Exam:  Blood pressure 124/84, pulse 70, temperature 97.6  F (36.4  C), temperature source Oral, resp. rate 19, weight 77.1 kg (170 lb), SpO2 99 %.      General: Laying in bed in no acute distress  HEENT: No icterus, no pallor  CVS/Chest: S1, S2 normal.   Respiratory/Chest: B/LCTA  GI/Abdomen/: Soft, NT, BS+  Extremities:  Others:    Data:  All laboratory data reviewed             Condition on Discharge:   Discharge condition: Stable   Code status on discharge: Full Code                Discharge Disposition:   Discharged to home               Pending Results:   Unresulted Labs Ordered in the Past 30 Days of this Admission     Date and Time Order Name Status Description    3/31/2021 4880 CSF Culture Aerobic Bacterial Preliminary                   Discharge Instructions and Follow-Up:     Discharge Procedure Orders   Reason for your hospital stay   Order Comments: Admitted for Gastroenteritis d/t Shigella     Adult [facility] Follow-up and recommended labs and tests   Order Comments: Follow up with primary care provider, [facility]  Service, within 7 days for hospital follow- up.  The following labs/tests are recommended: CBC, BMP.      Appointments on [place] and/or [facility] (with [facility] or [facility] provider or service). Call [phone] if you haven't heard regarding these appointments within 7 days of discharge.     Activity   Order Comments: Your activity upon discharge: activity as tolerated     Order Specific Question Answer Comments   Is discharge order? Yes      Monitor and record   Order Comments: Watch for fever, chills, nausea, vomiting, chest pain, shortness of breath, increased abdominal pain  If these symptoms occurs, please call your primary care or come to ED     Full Code     Order Specific Question Answer Comments   Code status determined by: Discussion with patient/ legal decision maker      Diet   Order Comments: Follow this diet upon discharge: Orders Placed This Encounter      Regular Diet Adult     Order Specific Question Answer Comments   Is discharge order? Yes           Attestation:  Ernesto Miranda MD.    Time spent on patient: 30 minutes total including face to face and coordinating care time reviewing current illness, any medication changes, and the care plan for today.

## 2021-04-06 LAB
BACTERIA SPEC CULT: NO GROWTH
SPECIMEN SOURCE: NORMAL

## 2021-04-25 ENCOUNTER — HEALTH MAINTENANCE LETTER (OUTPATIENT)
Age: 21
End: 2021-04-25

## 2021-10-10 ENCOUNTER — HEALTH MAINTENANCE LETTER (OUTPATIENT)
Age: 21
End: 2021-10-10

## 2022-05-21 ENCOUNTER — HEALTH MAINTENANCE LETTER (OUTPATIENT)
Age: 22
End: 2022-05-21

## 2022-09-18 ENCOUNTER — HEALTH MAINTENANCE LETTER (OUTPATIENT)
Age: 22
End: 2022-09-18

## 2022-10-20 NOTE — ED NOTES
Bed: ED09  Expected date:   Expected time:   Means of arrival:   Comments:  Vern mathewer  Covid +, influenza B +  Bloody diarrhea   On replacement

## 2023-03-29 DIAGNOSIS — G47.19 EXCESSIVE DAYTIME SLEEPINESS: Primary | ICD-10-CM

## 2023-03-29 DIAGNOSIS — G47.8 SLEEP PARALYSIS: ICD-10-CM

## 2023-04-04 ENCOUNTER — TELEPHONE (OUTPATIENT)
Dept: SLEEP MEDICINE | Facility: CLINIC | Age: 23
End: 2023-04-04
Payer: COMMERCIAL

## 2023-06-04 ENCOUNTER — HEALTH MAINTENANCE LETTER (OUTPATIENT)
Age: 23
End: 2023-06-04

## 2024-10-08 ENCOUNTER — OFFICE VISIT (OUTPATIENT)
Dept: URGENT CARE | Facility: URGENT CARE | Age: 24
End: 2024-10-08
Payer: OTHER MISCELLANEOUS

## 2024-10-08 VITALS
BODY MASS INDEX: 28.79 KG/M2 | HEIGHT: 68 IN | OXYGEN SATURATION: 98 % | SYSTOLIC BLOOD PRESSURE: 117 MMHG | WEIGHT: 190 LBS | RESPIRATION RATE: 16 BRPM | DIASTOLIC BLOOD PRESSURE: 69 MMHG | HEART RATE: 59 BPM

## 2024-10-08 DIAGNOSIS — Z23 NEED FOR TETANUS BOOSTER: Primary | ICD-10-CM

## 2024-10-08 DIAGNOSIS — S61.411A LACERATION OF RIGHT HAND, FOREIGN BODY PRESENCE UNSPECIFIED, INITIAL ENCOUNTER: ICD-10-CM

## 2024-10-08 PROCEDURE — 12001 RPR S/N/AX/GEN/TRNK 2.5CM/<: CPT | Performed by: PHYSICIAN ASSISTANT

## 2024-10-08 PROCEDURE — 90471 IMMUNIZATION ADMIN: CPT | Performed by: PHYSICIAN ASSISTANT

## 2024-10-08 PROCEDURE — 90715 TDAP VACCINE 7 YRS/> IM: CPT | Performed by: PHYSICIAN ASSISTANT

## 2024-10-08 RX ORDER — BUPROPION HYDROCHLORIDE 150 MG/1
150 TABLET ORAL EVERY MORNING
COMMUNITY

## 2024-10-08 RX ORDER — MIRTAZAPINE 15 MG/1
15 TABLET, ORALLY DISINTEGRATING ORAL AT BEDTIME
COMMUNITY

## 2024-10-08 NOTE — PROGRESS NOTES
"SUBJECTIVE:     Chief Complaint   Patient presents with    Laceration     Laceration on right hand from razor blade     Work Comp       [employer]  SHARA:  harvesting mushrooms, stab  DOI: 11am 10/8/24        Zainab Wells is a 24 year old female who presents to the clinic with a laceration on the right hand sustained 3 hour(s) ago.  This is a work related injury.    Mechanism of injury: .    Associated symptoms: Denies numbness, weakness, or loss of function  Last tetanus booster within 10 years: no    EXAM:   The patient appears today in alert,no apparent distress distress  VITALS: /69   Pulse 59   Resp 16   Ht 1.727 m (5' 8\")   Wt 86.2 kg (190 lb)   SpO2 98%   BMI 28.89 kg/m      Size of laceration: 8 millimeters  Characteristics of the laceration: bleeding- mild  Tendon function intact: yes  Sensation to light touch intact: yes  Pulses intact: not applicable  Picture included in patient's chart: no    Assessment:  (Z23) Need for tetanus booster  (primary encounter diagnosis)  (S61.411A) Laceration of right hand, foreign body presence unspecified, initial encounter  Comment: work comp  Plan: REPAIR SUPERFICIAL, WOUND BODY < =2.5CM        adacel    PLAN:  PROCEDURE NOTE::  Wound was locally injected with 1 cc's of Lidocaine 2% with epinephrine  Prepped and draped in the usual sterile fashion  Wound cleaned with betadine/saline solution  Wound cleaned with Shur-Clens  Deeper layers were approximated with 2 6-0 sutures interrupted sutures  After care instructions:  Keep wound clean and dry for the next 24-48 hours  Signs of infection discussed today  Apply anti-bacterial ointment for 3 days    "

## 2024-12-01 ENCOUNTER — HEALTH MAINTENANCE LETTER (OUTPATIENT)
Age: 24
End: 2024-12-01

## 2024-12-12 ENCOUNTER — OFFICE VISIT (OUTPATIENT)
Dept: FAMILY MEDICINE | Facility: CLINIC | Age: 24
End: 2024-12-12

## 2024-12-12 VITALS
DIASTOLIC BLOOD PRESSURE: 57 MMHG | OXYGEN SATURATION: 96 % | HEART RATE: 63 BPM | RESPIRATION RATE: 18 BRPM | HEIGHT: 69 IN | BODY MASS INDEX: 27.86 KG/M2 | TEMPERATURE: 98.2 F | SYSTOLIC BLOOD PRESSURE: 123 MMHG

## 2024-12-12 DIAGNOSIS — T78.40XA HYPERSENSITIVITY REACTION, INITIAL ENCOUNTER: Primary | ICD-10-CM

## 2024-12-12 DIAGNOSIS — J67.5: ICD-10-CM

## 2024-12-12 ASSESSMENT — PATIENT HEALTH QUESTIONNAIRE - PHQ9: SUM OF ALL RESPONSES TO PHQ QUESTIONS 1-9: 15

## 2024-12-12 NOTE — LETTER
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  She should not return to her current workplace to avoid continued exposure to the allergen.      Sincerely,    Shaheen Dumont MD

## 2024-12-13 NOTE — PATIENT INSTRUCTIONS
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 A note has been provided for her current employer to explain our medical recommendation.      Lab Tests Performed: None    Follow Up/Results: Return to clinic if symptoms persist beyond 1 month after last exposure to workplace allergen.    Referrals and Instructions: None    Physician: Shaheen Dumont MD

## 2024-12-13 NOTE — PROGRESS NOTES
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    She reports a productive cough with orange mucus, anterior chest tightness, nasal and sinus congestion that is pronounced in the mornings and evenings, dermatitis on bilateral flexural wrists, dyspnea with exercise, and increased fatigue that causes her to sleep 12-14 hours/day. Her symptoms have been especially pronounced when working in the harvesting area at the mushroom farm and she has recently moved to a different (non-harvesting) area. She used to wear an N95 mask at work, and when her symptoms increased she switched to a industrial-style respirator (however, she finds it uncomfortable and often removes it later in the work day). She notes that her manager has said that \"spore sickness\" is a common occurrence with new employees at the farm, and she often works with lion's rashad and blue oyster mushrooms which have orange/orange-pink spores.    REVIEW OF SYSTEMS:  Gen: no fevers, night sweats or weight change, positive for fatigue.  Eyes: no vision change, diplopia or red eyes  Ears, Noses, Mouth, Throat: no ringing in ears or hearing change, no epistaxis or nasal discharge, no oral lesions, throat clear, positive for nasal congestion  Cardiac: no palpitations, or pain with walking, positive for anterior chest tightness  Lungs: positive for cough, dyspnea with exercise  GI: no nausea, vomiting, diarrhea or constipation, no abdominal pain  : no change in urine, hematuria, or sexual dysfunction  Musculoskeletal: no joint or muscle pain or swelling  Endo: no temperature intolerance  Heme/Lymph: no concerning bumps, no bleeding problems  Allergy: allergic to sulfa drugs  Psych: positive for depression and anxiety    Past Medical History:   Diagnosis Date    Anemia     GERD (gastroesophageal reflux disease)        Past Surgical " History:   Procedure Laterality Date    ORTHOPEDIC SURGERY Right     shoulder       No family history on file.    Social History     Socioeconomic History    Marital status: Single     Spouse name: Not on file    Number of children: Not on file    Years of education: Not on file    Highest education level: Not on file   Occupational History    Not on file   Tobacco Use    Smoking status: Never    Smokeless tobacco: Never   Substance and Sexual Activity    Alcohol use: No    Drug use: No    Sexual activity: Not on file   Other Topics Concern    Parent/sibling w/ CABG, MI or angioplasty before 65F 55M? Not Asked   Social History Narrative    Not on file     Social Drivers of Health     Financial Resource Strain: Not on file   Food Insecurity: Not on file   Transportation Needs: Not on file   Physical Activity: Not on file   Stress: Not on file   Social Connections: Not on file   Interpersonal Safety: Not on file   Housing Stability: Not on file       OBJECTIVE:  Physical Exam:  There were no vitals taken for this visit.  Constitutional: no distress, comfortable, pleasant    Respiratory: clear to auscultation, no wheezes or crackles, normal breath sounds   Skin: no jaundice, positive for bilateral flexural wrist dermatitis  Psychological: appropriate mood   Lymphatic: no cervical  lymphadenopathy    ASSESSMENT/PLAN:  There are no diagnoses linked to this encounter.    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 We recommend that she avoid further exposure to the workplace and have provided her with a note to her employer explaining her medical condition and recommending cessation of this work.    Med Clinician: [place]  Preceptor: Shaheen Dumont MD    In supervising the {STUDENT TYPE:[id number]} student, I repeated the exam documented above.  I have reviewed and verified the  student s documentation.  Supervising Provider: ***

## 2024-12-13 NOTE — NURSING NOTE
"[facility] Nursing Progress Note     Zainab comes in to the clinic with lung irritation. She attributes her symptoms to her new job working at a [employer] that she started at the end of September. Her symptoms include congestion, chest pain, orange sputum, occasional headaches, some shortness of breath, and increased fatigue. 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 Her respiratory symptoms have progressively gotten worse the longer she has worked at the facility. Additionally, around 2 weeks after beginning her job, she began to experience a rash on her hands and wrists. She says that it is mildly itchy but difficult to tell as she also has eczema.     The patient mentioned that she does not currently have health insurance and is searching for options. She denied assistance with finding insurance at this moment.     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 Patient requested a note to excuse her from her job.   Lifestyle (level of physical activity): patient says she is relatively physically active during the day at work but does not exercise frequently      PHQ Score  PHQ 2: 6  PHQ 9: 15   Patient will return to clinic to speak with mental health nurse practioners. Patient states interest in receiving medication refills for her medications at future visit.     Nutrition Screen  Q1: Within the past 12 months, the patient was not worried whether her food would run out before she had money to buy more.   Q2: In the past 12 months, the patient was sometimes worried that she could afford to eat balanced meals.   Referral to nutrition needed?: Nutrition services offered to patient. Food pantry items were denied at this time.    "   Nursing Clinician: Cathy Brambila  Nursing Preceptor: Lorraine Maldonado, RN

## 2024-12-14 NOTE — PROGRESS NOTES
[facility] Progress Note    Chief Concern: Chest pain, cold symptoms, lung irritation     Last Date of Full Med: never seen at [facility] so 12/12/24     Subjective:  Zainab Wells is a 24 year old female who presents to the [facility] reporting lung irritation, chest pain, cold symptoms, and exhaustion. 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 She reports symptoms worsening in recent weeks and now has a productive cough with orange phlegm. She also has a rash on her wrists and her face. She has been out of the harvesting rooms for a couple days but has had no symptom alleviation. She has past medical history significant for eczema and depression. She reports consuming 1 cup of coffee per day and 2-3 alcoholic drinks 1-2 times per month. She reports smoking marijuana weekly but has stopped since symptom onset. She used to be physically active but now has had issues exercising due to chest pain. She is up to date on her vaccinations and is adherent to her medications but recently lost her insurance so will look at getting medications through [facility].       Current Outpatient Medications   Medication Sig Dispense Refill    acetaminophen (TYLENOL) 325 MG tablet Take 2 tablets (650 mg) by mouth every 4 hours as needed for mild pain 30 tablet 0    buPROPion (WELLBUTRIN XL) 150 MG 24 hr tablet Take 150 mg by mouth every morning.      cholecalciferol 25 MCG (1000 UT) TABS Take 1,000 Units by mouth daily (Patient not taking: Reported on 10/8/2024)      escitalopram (LEXAPRO) 10 MG tablet Take 10 mg by mouth daily (Patient not taking: Reported on 10/8/2024)      fish oil-omega-3 fatty acids 1000 MG capsule Take 1 g by mouth daily (Patient not taking: Reported on 10/8/2024)      levonorgestrel (MIRENA) 20 MCG/24HR IUD 1 each by Intrauterine route once (Patient not taking: Reported on 10/8/2024)      mirtazapine (REMERON  "SOL-TAB) 15 MG ODT 15 mg by Orally disintegrating tablet route at bedtime.      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 No medications recommended at this time.     Plan:  CHARLA received a note from the doctor on the medical necessity of leaving her job   JA will monitor for symptom alleviation       Follow-Up:  Patient will revisit [facility] for mental health services next week to work on a medication plan.   Patient will come in if symptoms don't resolve within 2 weeks of leaving the farm.     Pharmacy Clinician: Jie Horowitz, 3rd year pharmacy student   Pharmacy Preceptor: Osmar Pereira    _____________________________  Preceptor Use Only:  In supervising the student, I have reviewed and verified the student's documentation and found it to be correct and complete.   Preceptor Signature:    "

## 2024-12-16 ENCOUNTER — TELEPHONE (OUTPATIENT)
Dept: INTERNAL MEDICINE | Facility: CLINIC | Age: 24
End: 2024-12-16
Payer: COMMERCIAL

## 2024-12-16 NOTE — TELEPHONE ENCOUNTER
[facility] Call Center    Phone Message    May a detailed message be left on voicemail: yes     Reason for Call: Other: Pt saw  at a free clinic and was given our number to call, writer asked if Pt wanted to schedule with him and she stated that she wants him to fill out her unemployment paperwork. Please advise.        Action Taken: Other: PCC    Travel Screening: Not Applicable     Date of Service: 12/16/24

## 2024-12-17 NOTE — TELEPHONE ENCOUNTER
Unfortunately and that the timing is not very good for her since I will be going out of town for the next 10 days.  She is welcome to return to the [facility] on any Monday or Thursday evening and ask one of the people there to fill out her paperwork.  Shaheen Dumont MD, FACP

## 2025-06-25 ENCOUNTER — TELEPHONE (OUTPATIENT)
Dept: FAMILY MEDICINE | Facility: CLINIC | Age: 25
End: 2025-06-25
Payer: COMMERCIAL

## (undated) RX ORDER — SODIUM CHLORIDE 9 MG/ML
INJECTION, SOLUTION INTRAVENOUS
Status: DISPENSED
Start: 2018-10-02